# Patient Record
Sex: FEMALE | Race: WHITE | HISPANIC OR LATINO | Employment: FULL TIME | URBAN - METROPOLITAN AREA
[De-identification: names, ages, dates, MRNs, and addresses within clinical notes are randomized per-mention and may not be internally consistent; named-entity substitution may affect disease eponyms.]

---

## 2023-02-21 PROBLEM — E53.8 VITAMIN B12 DEFICIENCY: Status: ACTIVE | Noted: 2023-02-21

## 2023-02-21 PROBLEM — F90.2 ADHD (ATTENTION DEFICIT HYPERACTIVITY DISORDER), COMBINED TYPE: Status: ACTIVE | Noted: 2023-02-21

## 2023-02-21 PROBLEM — Z86.59 HISTORY OF DYSTHYMIC DISORDER: Status: ACTIVE | Noted: 2023-02-21

## 2023-02-21 PROBLEM — J45.909 ASTHMA (HHS-HCC): Status: ACTIVE | Noted: 2023-02-21

## 2023-02-21 PROBLEM — E34.9 HORMONE IMBALANCE: Status: ACTIVE | Noted: 2023-02-21

## 2023-02-21 PROBLEM — F90.9 HYPERKINETIC SYNDROME: Status: ACTIVE | Noted: 2023-02-21

## 2023-02-21 PROBLEM — E78.5 HYPERLIPEMIA: Status: ACTIVE | Noted: 2023-02-21

## 2023-02-21 PROBLEM — N39.46 URGE AND STRESS INCONTINENCE: Status: ACTIVE | Noted: 2023-02-21

## 2023-02-21 PROBLEM — J30.9 ALLERGIC RHINITIS: Status: ACTIVE | Noted: 2023-02-21

## 2023-02-21 PROBLEM — E55.9 VITAMIN D INSUFFICIENCY: Status: ACTIVE | Noted: 2023-02-21

## 2023-02-21 PROBLEM — R92.8 ABNORMAL MAMMOGRAM: Status: ACTIVE | Noted: 2023-02-21

## 2023-02-21 RX ORDER — ATOMOXETINE 40 MG/1
1 CAPSULE ORAL 2 TIMES DAILY
COMMUNITY
Start: 2020-06-09 | End: 2023-03-29

## 2023-02-21 RX ORDER — SYRINGE W-NEEDLE,DISPOSAB,3 ML 25GX5/8"
SYRINGE, EMPTY DISPOSABLE MISCELLANEOUS
COMMUNITY
Start: 2021-10-15 | End: 2023-03-29 | Stop reason: ALTCHOICE

## 2023-02-21 RX ORDER — DEXTROAMPHETAMINE SACCHARATE, AMPHETAMINE ASPARTATE, DEXTROAMPHETAMINE SULFATE AND AMPHETAMINE SULFATE 5; 5; 5; 5 MG/1; MG/1; MG/1; MG/1
1 TABLET ORAL 2 TIMES DAILY
COMMUNITY
Start: 2021-09-17 | End: 2023-03-29

## 2023-02-21 RX ORDER — ALBUTEROL SULFATE 90 UG/1
1-2 AEROSOL, METERED RESPIRATORY (INHALATION) EVERY 6 HOURS PRN
COMMUNITY
Start: 2020-06-09 | End: 2023-03-29 | Stop reason: SDUPTHER

## 2023-02-21 RX ORDER — CYANOCOBALAMIN 1000 UG/ML
1000 INJECTION, SOLUTION INTRAMUSCULAR; SUBCUTANEOUS
COMMUNITY
Start: 2021-06-09 | End: 2023-03-29 | Stop reason: ALTCHOICE

## 2023-03-29 ENCOUNTER — OFFICE VISIT (OUTPATIENT)
Dept: PRIMARY CARE | Facility: CLINIC | Age: 48
End: 2023-03-29
Payer: COMMERCIAL

## 2023-03-29 VITALS
SYSTOLIC BLOOD PRESSURE: 102 MMHG | DIASTOLIC BLOOD PRESSURE: 70 MMHG | HEIGHT: 62 IN | BODY MASS INDEX: 35.51 KG/M2 | WEIGHT: 193 LBS | HEART RATE: 85 BPM

## 2023-03-29 DIAGNOSIS — E66.01 SEVERE OBESITY (BMI 35.0-35.9 WITH COMORBIDITY) (MULTI): ICD-10-CM

## 2023-03-29 DIAGNOSIS — Z12.11 COLON CANCER SCREENING: ICD-10-CM

## 2023-03-29 DIAGNOSIS — E53.8 VITAMIN B12 DEFICIENCY: ICD-10-CM

## 2023-03-29 DIAGNOSIS — J30.89 ALLERGIC RHINITIS DUE TO OTHER ALLERGIC TRIGGER, UNSPECIFIED SEASONALITY: ICD-10-CM

## 2023-03-29 DIAGNOSIS — J45.20 MILD INTERMITTENT ASTHMA, UNSPECIFIED WHETHER COMPLICATED (HHS-HCC): Primary | ICD-10-CM

## 2023-03-29 DIAGNOSIS — E55.9 VITAMIN D INSUFFICIENCY: ICD-10-CM

## 2023-03-29 DIAGNOSIS — Z12.31 VISIT FOR SCREENING MAMMOGRAM: ICD-10-CM

## 2023-03-29 DIAGNOSIS — Z79.899 LONG TERM CURRENT USE OF THERAPEUTIC DRUG: ICD-10-CM

## 2023-03-29 DIAGNOSIS — F90.2 ADHD (ATTENTION DEFICIT HYPERACTIVITY DISORDER), COMBINED TYPE: ICD-10-CM

## 2023-03-29 LAB
AMPHETAMINE (PRESENCE) IN URINE BY SCREEN METHOD: NORMAL
BARBITURATES PRESENCE IN URINE BY SCREEN METHOD: NORMAL
BENZODIAZEPINE (PRESENCE) IN URINE BY SCREEN METHOD: NORMAL
CANNABINOIDS IN URINE BY SCREEN METHOD: NORMAL
COCAINE (PRESENCE) IN URINE BY SCREEN METHOD: NORMAL
DRUG SCREEN COMMENT URINE: NORMAL
FENTANYL URINE: NORMAL
METHADONE (PRESENCE) IN URINE BY SCREEN METHOD: NORMAL
OPIATES (PRESENCE) IN URINE BY SCREEN METHOD: NORMAL
OXYCODONE (PRESENCE) IN URINE BY SCREEN METHOD: NORMAL
PHENCYCLIDINE (PRESENCE) IN URINE BY SCREEN METHOD: NORMAL

## 2023-03-29 PROCEDURE — 80307 DRUG TEST PRSMV CHEM ANLYZR: CPT

## 2023-03-29 PROCEDURE — 99214 OFFICE O/P EST MOD 30 MIN: CPT | Performed by: CLINICAL NURSE SPECIALIST

## 2023-03-29 PROCEDURE — 1036F TOBACCO NON-USER: CPT | Performed by: CLINICAL NURSE SPECIALIST

## 2023-03-29 PROCEDURE — 3008F BODY MASS INDEX DOCD: CPT | Performed by: CLINICAL NURSE SPECIALIST

## 2023-03-29 RX ORDER — DEXTROAMPHETAMINE SACCHARATE, AMPHETAMINE ASPARTATE, DEXTROAMPHETAMINE SULFATE AND AMPHETAMINE SULFATE 5; 5; 5; 5 MG/1; MG/1; MG/1; MG/1
20 TABLET ORAL 2 TIMES DAILY
Qty: 60 TABLET | Refills: 0 | Status: SHIPPED | OUTPATIENT
Start: 2023-03-29 | End: 2023-11-06 | Stop reason: ALTCHOICE

## 2023-03-29 RX ORDER — ATOMOXETINE 40 MG/1
40 CAPSULE ORAL 2 TIMES DAILY
Qty: 60 CAPSULE | Refills: 0 | Status: SHIPPED | OUTPATIENT
Start: 2023-03-29 | End: 2023-04-25

## 2023-03-29 RX ORDER — ALBUTEROL SULFATE 90 UG/1
1-2 AEROSOL, METERED RESPIRATORY (INHALATION) EVERY 6 HOURS PRN
Qty: 18 G | Refills: 11 | Status: SHIPPED | OUTPATIENT
Start: 2023-03-29 | End: 2024-03-21 | Stop reason: ALTCHOICE

## 2023-03-29 ASSESSMENT — ENCOUNTER SYMPTOMS
CHEST TIGHTNESS: 0
EYE PAIN: 0
FATIGUE: 0
APPETITE CHANGE: 0
NECK PAIN: 0
CONFUSION: 0
SHORTNESS OF BREATH: 0
DEPRESSION: 0
BRUISES/BLEEDS EASILY: 0
DIZZINESS: 0
TROUBLE SWALLOWING: 0
DYSURIA: 0
SEIZURES: 0
OCCASIONAL FEELINGS OF UNSTEADINESS: 0
FEVER: 0
CHILLS: 0
ABDOMINAL PAIN: 0
WOUND: 0
VOMITING: 0
LOSS OF SENSATION IN FEET: 0
POLYDIPSIA: 0
HEADACHES: 0
FLANK PAIN: 0
COUGH: 0
PHOTOPHOBIA: 0
MYALGIAS: 0
DIARRHEA: 0
CONSTIPATION: 0
BACK PAIN: 0
HEMATURIA: 0
BLOOD IN STOOL: 0
SLEEP DISTURBANCE: 0
PALPITATIONS: 0
ACTIVITY CHANGE: 0
NAUSEA: 0
UNEXPECTED WEIGHT CHANGE: 0
WHEEZING: 0
JOINT SWELLING: 0
ARTHRALGIAS: 0
SORE THROAT: 0

## 2023-03-29 ASSESSMENT — PATIENT HEALTH QUESTIONNAIRE - PHQ9
2. FEELING DOWN, DEPRESSED OR HOPELESS: NOT AT ALL
SUM OF ALL RESPONSES TO PHQ9 QUESTIONS 1 AND 2: 0
1. LITTLE INTEREST OR PLEASURE IN DOING THINGS: NOT AT ALL

## 2023-03-29 ASSESSMENT — COLUMBIA-SUICIDE SEVERITY RATING SCALE - C-SSRS
2. HAVE YOU ACTUALLY HAD ANY THOUGHTS OF KILLING YOURSELF?: NO
6. HAVE YOU EVER DONE ANYTHING, STARTED TO DO ANYTHING, OR PREPARED TO DO ANYTHING TO END YOUR LIFE?: NO
1. IN THE PAST MONTH, HAVE YOU WISHED YOU WERE DEAD OR WISHED YOU COULD GO TO SLEEP AND NOT WAKE UP?: NO

## 2023-03-29 NOTE — PROGRESS NOTES
Subjective   Patient ID: Wendy Nolasco is a 48 y.o. female who presents for Follow-up (Discuss asthma/allergies, ADD).  HPI  OARRS:  Leatha Holt, APRN-CNS on 3/29/2023  3:02 PM  I have personally reviewed the OARRS report for Wendy Nolasco. I have considered the risks of abuse, dependence, addiction and diversion and I believe that it is clinically appropriate for Wendy Nolasco to be prescribed this medication    Is the patient prescribed a combination of a benzodiazepine and opioid?  No    Last Urine Drug Screen / ordered today: Yes  Recent Results (from the past 88558 hour(s))   OPIATE/OPIOID/BENZO PRESCRIPTION COMPLIANCE    Collection Time: 07/30/21 10:11 AM   Result Value Ref Range    DRUG SCREEN COMMENT URINE SEE BELOW     Creatine, Urine 66.3 mg/dL    Amphetamine Screen, Urine PRESUMPTIVE NEGATIVE NEGATIVE    Barbiturate Screen, Urine PRESUMPTIVE NEGATIVE NEGATIVE    Cannabinoid Screen, Urine PRESUMPTIVE NEGATIVE NEGATIVE    Cocaine Screen, Urine PRESUMPTIVE NEGATIVE NEGATIVE    PCP Screen, Urine PRESUMPTIVE NEGATIVE NEGATIVE    7-Aminoclonazepam <25 Cutoff <25 ng/mL    Alpha-Hydroxyalprazolam <25 Cutoff <25 ng/mL    Alpha-Hydroxymidazolam <25 Cutoff <25 ng/mL    Alprazolam <25 Cutoff <25 ng/mL    Chlordiazepoxide <25 Cutoff <25 ng/mL    Clonazepam <25 Cutoff <25 ng/mL    Diazepam <25 Cutoff <25 ng/mL    Lorazepam <25 Cutoff <25 ng/mL    Midazolam <25 Cutoff <25 ng/mL    Nordiazepam <25 Cutoff <25 ng/mL    Oxazepam <25 Cutoff <25 ng/mL    Temazepam <25 Cutoff <25 ng/mL    Zolpidem <25 Cutoff <25 ng/mL    Zolpidem Metabolite (ZCA) <25 Cutoff <25 ng/mL    6-Acetylmorphine <25 Cutoff <25 ng/mL    Codeine <50 Cutoff <50 ng/mL    Hydrocodone <25 Cutoff <25 ng/mL    Hydromorphone <25 Cutoff <25 ng/mL    Morphine Urine <50 Cutoff <50 ng/mL    Norhydrocodone <25 Cutoff <25 ng/mL    Noroxycodone <25 Cutoff <25 ng/mL    Oxycodone <25 Cutoff <25 ng/mL    Oxymorphone <25 Cutoff <25 ng/mL    Tramadol <50 Cutoff <50  ng/mL    O-Desmethyltramadol <50 Cutoff <50 ng/mL    Fentanyl <2.5 Cutoff<2.5 ng/mL    Norfentanyl <2.5 Cutoff<2.5 ng/mL    METHADONE CONFIRMATION,URINE <25 Cutoff <25 ng/mL    EDDP <25 Cutoff <25 ng/mL   Drug Screen, Urine With Reflex to Confirmation    Collection Time: 06/09/21 12:24 PM   Result Value Ref Range    DRUG SCREEN COMMENT URINE SEE BELOW     Amphetamine Screen, Urine PRESUMPTIVE NEGATIVE NEGATIVE    Barbiturate Screen, Urine PRESUMPTIVE NEGATIVE NEGATIVE    BENZODIAZEPINE (PRESENCE) IN URINE BY SCREEN METHOD PRESUMPTIVE NEGATIVE NEGATIVE    Cannabinoid Screen, Urine PRESUMPTIVE POSITIVE (A) NEGATIVE    Cocaine Screen, Urine PRESUMPTIVE NEGATIVE NEGATIVE    Fentanyl, Ur PRESUMPTIVE NEGATIVE NEGATIVE    Methadone Screen, Urine PRESUMPTIVE NEGATIVE NEGATIVE    Opiate Screen, Urine PRESUMPTIVE NEGATIVE NEGATIVE    Oxycodone Screen, Ur PRESUMPTIVE NEGATIVE NEGATIVE    PCP Screen, Urine PRESUMPTIVE NEGATIVE NEGATIVE   Amphetamine Confirm, Urine    Collection Time: 06/09/21 12:24 PM   Result Value Ref Range    Amphetamines,Urine <50 ng/mL    MDA, Urine <200 ng/mL    MDEA, Urine <200 ng/mL    MDMA, Urine <200 ng/mL    Methamphetamine Quant, Ur <200 ng/mL    Phentermine,Urine <200 ng/mL     N/A    Controlled Substance Agreement:  Date of the Last Agreement: 03/29/2023  Reviewed Controlled Substance Agreement including but not limited to the benefits, risks, and alternatives to treatment with a Controlled Substance medication(s).    Stimulants:   What is the patient's goal of therapy? To be able to Focus  Is this being achieved with current treatment? Previously effective.     Activities of Daily Living:   Is your overall impression that this patient is benefiting (symptom reduction outweighs side effects) from stimulant therapy? Restarting management.     1. Physical Functioning: Worse  2. Family Relationship: Worse  3. Social Relationship: Worse  4. Mood: Worse  5. Sleep Patterns: Worse  6. Overall Function:  Worse    Here today as a follow up appointment.     She was previously diagnosed with ADHD and managed through Psychiatry, difficulty with their follow up appointment. Unable to follow with Neurology due to closest location for management was [place] per patient. Symptoms well controlled on Strattera and Adderall at dosage.     Had been treated for ADHD in the past. Has had to drop out of school without medications. Scattered, trouble adjusting. Difficulty staying on task. Struggling with her work environment. Adjusted timing of medications at last OV, feels that it has been more effective for her.     Did not tolerate Wellbutrin in the past. Tried Strattera alone in the past, also not beneficial.     History of Asthma. Controlled. She uses her Albuterol Inhaler PRN she states with the weather change she needed a few times a week but other wise does not use every week. Has not had to use for the last few months. Started on Asmanex in September but states that she no longer needs a maintenance inhaler. She states that her symptoms are normally well controlled when she avoids her triggers. Changed work environment which has helped. Does have seasonal allergies with constant nasal congestion and drainage that has been clear. Not interested in nasal spray. Denies any shortness of breath. Patient has been having relief with her symptoms with the use of Singulair and Loratadine. Was able to stop both Rx. and symptoms have been controlled without the use of Medication.     Complaints of incontinence. Followed with UroGyn. Recommended PT. Had to cancel PT and has not restated seeing yet.     Review of Systems   Constitutional:  Negative for activity change, appetite change, chills, fatigue, fever and unexpected weight change.   HENT:  Negative for ear pain, hearing loss, nosebleeds, sore throat, tinnitus and trouble swallowing.    Eyes:  Negative for photophobia, pain and visual disturbance.   Respiratory:  Negative for  cough, chest tightness, shortness of breath and wheezing.    Cardiovascular:  Negative for chest pain, palpitations and leg swelling.   Gastrointestinal:  Negative for abdominal pain, blood in stool, constipation, diarrhea, nausea and vomiting.   Endocrine: Negative for cold intolerance, heat intolerance, polydipsia and polyuria.   Genitourinary:  Negative for dysuria, flank pain and hematuria.   Musculoskeletal:  Negative for arthralgias, back pain, joint swelling, myalgias and neck pain.   Skin:  Negative for pallor, rash and wound.   Allergic/Immunologic: Negative for immunocompromised state.   Neurological:  Negative for dizziness, seizures and headaches.   Hematological:  Does not bruise/bleed easily.   Psychiatric/Behavioral:  Negative for confusion and sleep disturbance.        Objective   Physical Exam  Vitals and nursing note reviewed.   Constitutional:       General: She is not in acute distress.     Appearance: Normal appearance.   HENT:      Head: Normocephalic.      Nose: Nose normal.   Eyes:      Conjunctiva/sclera: Conjunctivae normal.   Neck:      Vascular: No carotid bruit.   Cardiovascular:      Rate and Rhythm: Normal rate and regular rhythm.      Pulses: Normal pulses.      Heart sounds: Normal heart sounds.   Pulmonary:      Effort: Pulmonary effort is normal.      Breath sounds: Normal breath sounds.   Abdominal:      General: Bowel sounds are normal.      Palpations: Abdomen is soft.   Musculoskeletal:         General: Normal range of motion.      Cervical back: Normal range of motion.   Skin:     General: Skin is warm and dry.   Neurological:      Mental Status: She is alert and oriented to person, place, and time. Mental status is at baseline.   Psychiatric:         Mood and Affect: Mood normal.         Behavior: Behavior normal.         Assessment/Plan       New order for blood work provided at  today.     #1. Asthma: Well controlled with Albuterol PRN. No acute symptoms noted at present.  No longer needing maintenance inhaler.   #2. Vitamin D Deficiency: Vitamin D. Repeat level with next blood work.  #3. Obesity: Last BMI: 35.30. Previously given information on Bariatric Weight Management program. Continuing to work on weight loss.   #4. Allergic Rhinitis: Symptoms are controlled without the use of medication at present. Continue to monitor.   #5. ADHD, #6. 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 Records received in 2015. She states that she needs a new Provider that is more local but [facility] the closest is [facility] per patient. She had been taking Strattera and Adderall XR and had to stop Rx. due to Insurance and then had other home complications. Not as effective, change Adderall. Continue current dosage. I have personally reviewed the OARRS report. This report is scanned into the electronic medical record. I have considered the risks of abuse, dependence, addiction and diversion. I believe that it is clinically appropriate to prescribe this medication. Strattera and Adderall Rx. Follow up in 3 months. UDS: 03/29/2023 Contract: 03/29/2023  #8. Hyperlipidemia: Patient has continued to work on lifestyle modifications. Continue to monitor. Repeat level with next blood work.   #9. Vitamin B12 Deficiency: Vitamin B12 injections. Continue to monitor. Repeat level with next blood work.   #10. Incontinence: Uro/Gyn referral. Ordered PT and was to follow up after.     Declined Flu Vaccine  Mammogram: February 2021. Ordered for 2023.  Cologuard: Ordered.

## 2023-04-04 ENCOUNTER — TELEPHONE (OUTPATIENT)
Dept: PRIMARY CARE | Facility: CLINIC | Age: 48
End: 2023-04-04
Payer: COMMERCIAL

## 2023-04-04 DIAGNOSIS — J45.20 MILD INTERMITTENT ASTHMA, UNSPECIFIED WHETHER COMPLICATED (HHS-HCC): ICD-10-CM

## 2023-04-04 DIAGNOSIS — E53.8 VITAMIN B12 DEFICIENCY: ICD-10-CM

## 2023-04-04 RX ORDER — CYANOCOBALAMIN 1000 UG/ML
1000 INJECTION, SOLUTION INTRAMUSCULAR; SUBCUTANEOUS
Qty: 1 ML | Refills: 11 | Status: SHIPPED | OUTPATIENT
Start: 2023-04-04

## 2023-04-04 RX ORDER — ALBUTEROL SULFATE 90 UG/1
2 AEROSOL, METERED RESPIRATORY (INHALATION) EVERY 4 HOURS PRN
Qty: 18 G | Refills: 5 | Status: SHIPPED | OUTPATIENT
Start: 2023-04-04

## 2023-04-04 RX ORDER — SYRINGE W-NEEDLE,DISPOSAB,3 ML 25GX5/8"
1 SYRINGE, EMPTY DISPOSABLE MISCELLANEOUS
Qty: 1 EACH | Refills: 11 | Status: SHIPPED | OUTPATIENT
Start: 2023-04-04 | End: 2024-03-21 | Stop reason: ALTCHOICE

## 2023-04-04 NOTE — TELEPHONE ENCOUNTER
She called about a scripts for a Vitamin B called in to [facility] in [place] and she also stated that the inhaler was not approved by her ins. If you have any questions please call her at [phone]

## 2023-04-05 ENCOUNTER — LAB (OUTPATIENT)
Dept: LAB | Facility: LAB | Age: 48
End: 2023-04-05
Payer: COMMERCIAL

## 2023-04-05 DIAGNOSIS — E53.8 VITAMIN B12 DEFICIENCY: ICD-10-CM

## 2023-04-05 DIAGNOSIS — J45.20 MILD INTERMITTENT ASTHMA, UNSPECIFIED WHETHER COMPLICATED (HHS-HCC): ICD-10-CM

## 2023-04-05 DIAGNOSIS — J30.89 ALLERGIC RHINITIS DUE TO OTHER ALLERGIC TRIGGER, UNSPECIFIED SEASONALITY: ICD-10-CM

## 2023-04-05 DIAGNOSIS — E55.9 VITAMIN D INSUFFICIENCY: ICD-10-CM

## 2023-04-05 DIAGNOSIS — F90.2 ADHD (ATTENTION DEFICIT HYPERACTIVITY DISORDER), COMBINED TYPE: ICD-10-CM

## 2023-04-05 LAB
ALANINE AMINOTRANSFERASE (SGPT) (U/L) IN SER/PLAS: 11 U/L (ref 7–45)
ALBUMIN (G/DL) IN SER/PLAS: 4 G/DL (ref 3.4–5)
ALKALINE PHOSPHATASE (U/L) IN SER/PLAS: 77 U/L (ref 33–110)
ANION GAP IN SER/PLAS: 15 MMOL/L (ref 10–20)
ASPARTATE AMINOTRANSFERASE (SGOT) (U/L) IN SER/PLAS: 12 U/L (ref 9–39)
BILIRUBIN TOTAL (MG/DL) IN SER/PLAS: 0.5 MG/DL (ref 0–1.2)
CALCIDIOL (25 OH VITAMIN D3) (NG/ML) IN SER/PLAS: 13 NG/ML
CALCIUM (MG/DL) IN SER/PLAS: 9.2 MG/DL (ref 8.6–10.6)
CARBON DIOXIDE, TOTAL (MMOL/L) IN SER/PLAS: 26 MMOL/L (ref 21–32)
CHLORIDE (MMOL/L) IN SER/PLAS: 105 MMOL/L (ref 98–107)
CHOLESTEROL (MG/DL) IN SER/PLAS: 191 MG/DL (ref 0–199)
CHOLESTEROL IN HDL (MG/DL) IN SER/PLAS: 48.2 MG/DL
CHOLESTEROL/HDL RATIO: 4
COBALAMIN (VITAMIN B12) (PG/ML) IN SER/PLAS: 337 PG/ML (ref 211–911)
CREATININE (MG/DL) IN SER/PLAS: 0.85 MG/DL (ref 0.5–1.05)
ERYTHROCYTE DISTRIBUTION WIDTH (RATIO) BY AUTOMATED COUNT: 15.5 % (ref 11.5–14.5)
ERYTHROCYTE MEAN CORPUSCULAR HEMOGLOBIN CONCENTRATION (G/DL) BY AUTOMATED: 31.2 G/DL (ref 32–36)
ERYTHROCYTE MEAN CORPUSCULAR VOLUME (FL) BY AUTOMATED COUNT: 84 FL (ref 80–100)
ERYTHROCYTES (10*6/UL) IN BLOOD BY AUTOMATED COUNT: 4.89 X10E12/L (ref 4–5.2)
GFR FEMALE: 85 ML/MIN/1.73M2
GLUCOSE (MG/DL) IN SER/PLAS: 97 MG/DL (ref 74–99)
HEMATOCRIT (%) IN BLOOD BY AUTOMATED COUNT: 41 % (ref 36–46)
HEMOGLOBIN (G/DL) IN BLOOD: 12.8 G/DL (ref 12–16)
LDL: 115 MG/DL (ref 0–99)
LEUKOCYTES (10*3/UL) IN BLOOD BY AUTOMATED COUNT: 4.7 X10E9/L (ref 4.4–11.3)
NRBC (PER 100 WBCS) BY AUTOMATED COUNT: 0 /100 WBC (ref 0–0)
PLATELETS (10*3/UL) IN BLOOD AUTOMATED COUNT: 326 X10E9/L (ref 150–450)
POTASSIUM (MMOL/L) IN SER/PLAS: 4.5 MMOL/L (ref 3.5–5.3)
PROTEIN TOTAL: 7 G/DL (ref 6.4–8.2)
SODIUM (MMOL/L) IN SER/PLAS: 141 MMOL/L (ref 136–145)
THYROTROPIN (MIU/L) IN SER/PLAS BY DETECTION LIMIT <= 0.05 MIU/L: 0.83 MIU/L (ref 0.44–3.98)
TRIGLYCERIDE (MG/DL) IN SER/PLAS: 138 MG/DL (ref 0–149)
UREA NITROGEN (MG/DL) IN SER/PLAS: 10 MG/DL (ref 6–23)
VLDL: 28 MG/DL (ref 0–40)

## 2023-04-05 PROCEDURE — 82607 VITAMIN B-12: CPT

## 2023-04-05 PROCEDURE — 82306 VITAMIN D 25 HYDROXY: CPT

## 2023-04-05 PROCEDURE — 84443 ASSAY THYROID STIM HORMONE: CPT

## 2023-04-05 PROCEDURE — 80061 LIPID PANEL: CPT

## 2023-04-05 PROCEDURE — 85027 COMPLETE CBC AUTOMATED: CPT

## 2023-04-05 PROCEDURE — 36415 COLL VENOUS BLD VENIPUNCTURE: CPT

## 2023-04-05 PROCEDURE — 80053 COMPREHEN METABOLIC PANEL: CPT

## 2023-04-06 ENCOUNTER — TELEPHONE (OUTPATIENT)
Dept: PRIMARY CARE | Facility: CLINIC | Age: 48
End: 2023-04-06
Payer: COMMERCIAL

## 2023-04-06 NOTE — TELEPHONE ENCOUNTER
----- Message from CLARENCE Cohen-CNS sent at 4/5/2023  9:22 PM EDT -----  Please call patient with lab results. Vitamin D is lower, should increase Vitamin D. B12 is slightly improved, recommend to continue injections. Cholesterol has shown some improvement. Focus on healthy dietary changes. Can discuss further detail at follow up appointment. Thank you!    7/19/18 c/s arrest F 7#8, P[facility]?

## 2023-04-12 ENCOUNTER — TELEPHONE (OUTPATIENT)
Dept: PRIMARY CARE | Facility: CLINIC | Age: 48
End: 2023-04-12
Payer: COMMERCIAL

## 2023-04-12 LAB — NONINV COLON CA DNA+OCC BLD SCRN STL QL: NEGATIVE

## 2023-04-12 NOTE — TELEPHONE ENCOUNTER
----- Message from CLARENCE Cohen-CNS sent at 4/12/2023  3:01 PM EDT -----  Cologuard negative. Plan to repeat in 3 years. Thank you!

## 2023-04-25 DIAGNOSIS — F90.2 ADHD (ATTENTION DEFICIT HYPERACTIVITY DISORDER), COMBINED TYPE: ICD-10-CM

## 2023-04-25 RX ORDER — ATOMOXETINE 40 MG/1
CAPSULE ORAL
Qty: 60 CAPSULE | Refills: 0 | Status: SHIPPED | OUTPATIENT
Start: 2023-04-25 | End: 2023-12-12 | Stop reason: WASHOUT

## 2023-06-28 ENCOUNTER — OFFICE VISIT (OUTPATIENT)
Dept: PRIMARY CARE | Facility: CLINIC | Age: 48
End: 2023-06-28
Payer: COMMERCIAL

## 2023-06-28 VITALS
HEART RATE: 81 BPM | WEIGHT: 182 LBS | SYSTOLIC BLOOD PRESSURE: 110 MMHG | HEIGHT: 63 IN | DIASTOLIC BLOOD PRESSURE: 62 MMHG | BODY MASS INDEX: 32.25 KG/M2

## 2023-06-28 DIAGNOSIS — J30.89 ALLERGIC RHINITIS DUE TO OTHER ALLERGIC TRIGGER, UNSPECIFIED SEASONALITY: ICD-10-CM

## 2023-06-28 DIAGNOSIS — J45.20 MILD INTERMITTENT ASTHMA, UNSPECIFIED WHETHER COMPLICATED (HHS-HCC): ICD-10-CM

## 2023-06-28 DIAGNOSIS — F90.2 ADHD (ATTENTION DEFICIT HYPERACTIVITY DISORDER), COMBINED TYPE: ICD-10-CM

## 2023-06-28 DIAGNOSIS — F90.9 HYPERKINETIC SYNDROME: ICD-10-CM

## 2023-06-28 DIAGNOSIS — R10.9 ABDOMINAL PAIN, UNSPECIFIED ABDOMINAL LOCATION: ICD-10-CM

## 2023-06-28 DIAGNOSIS — E55.9 VITAMIN D INSUFFICIENCY: ICD-10-CM

## 2023-06-28 DIAGNOSIS — Z86.59 HISTORY OF DYSTHYMIC DISORDER: Primary | ICD-10-CM

## 2023-06-28 DIAGNOSIS — E53.8 VITAMIN B12 DEFICIENCY: ICD-10-CM

## 2023-06-28 PROCEDURE — 3008F BODY MASS INDEX DOCD: CPT | Performed by: CLINICAL NURSE SPECIALIST

## 2023-06-28 PROCEDURE — 99214 OFFICE O/P EST MOD 30 MIN: CPT | Performed by: CLINICAL NURSE SPECIALIST

## 2023-06-28 PROCEDURE — 1036F TOBACCO NON-USER: CPT | Performed by: CLINICAL NURSE SPECIALIST

## 2023-06-28 RX ORDER — DEXTROAMPHETAMINE SACCHARATE, AMPHETAMINE ASPARTATE, DEXTROAMPHETAMINE SULFATE AND AMPHETAMINE SULFATE 5; 5; 5; 5 MG/1; MG/1; MG/1; MG/1
20 TABLET ORAL 2 TIMES DAILY
Qty: 60 TABLET | Refills: 0 | Status: CANCELLED | OUTPATIENT
Start: 2023-06-28 | End: 2023-07-28

## 2023-06-28 ASSESSMENT — ENCOUNTER SYMPTOMS
NECK PAIN: 0
DIARRHEA: 0
MYALGIAS: 0
ACTIVITY CHANGE: 0
WHEEZING: 0
DYSURIA: 0
CONSTIPATION: 0
NAUSEA: 0
PHOTOPHOBIA: 0
VOMITING: 0
SHORTNESS OF BREATH: 0
EYE PAIN: 0
UNEXPECTED WEIGHT CHANGE: 0
SEIZURES: 0
DIZZINESS: 0
OCCASIONAL FEELINGS OF UNSTEADINESS: 0
CHEST TIGHTNESS: 0
HEMATURIA: 0
SORE THROAT: 0
FATIGUE: 0
ARTHRALGIAS: 0
BRUISES/BLEEDS EASILY: 0
PALPITATIONS: 0
WOUND: 0
ABDOMINAL PAIN: 0
DEPRESSION: 0
BACK PAIN: 0
POLYDIPSIA: 0
FEVER: 0
TROUBLE SWALLOWING: 0
COUGH: 0
HEADACHES: 0
SLEEP DISTURBANCE: 0
APPETITE CHANGE: 0
CHILLS: 0
CONFUSION: 0
BLOOD IN STOOL: 0
JOINT SWELLING: 0
FLANK PAIN: 0
LOSS OF SENSATION IN FEET: 0

## 2023-06-28 ASSESSMENT — PATIENT HEALTH QUESTIONNAIRE - PHQ9
SUM OF ALL RESPONSES TO PHQ9 QUESTIONS 1 AND 2: 0
1. LITTLE INTEREST OR PLEASURE IN DOING THINGS: NOT AT ALL
2. FEELING DOWN, DEPRESSED OR HOPELESS: NOT AT ALL

## 2023-06-28 ASSESSMENT — COLUMBIA-SUICIDE SEVERITY RATING SCALE - C-SSRS
1. IN THE PAST MONTH, HAVE YOU WISHED YOU WERE DEAD OR WISHED YOU COULD GO TO SLEEP AND NOT WAKE UP?: NO
2. HAVE YOU ACTUALLY HAD ANY THOUGHTS OF KILLING YOURSELF?: NO
6. HAVE YOU EVER DONE ANYTHING, STARTED TO DO ANYTHING, OR PREPARED TO DO ANYTHING TO END YOUR LIFE?: NO

## 2023-06-28 NOTE — PROGRESS NOTES
Subjective   Patient ID: Wendy Nolasco is a 48 y.o. female who presents for Follow-up (Follow up).  HPI    OARRS:  Leatha Holt, APRN-CNS on 6/28/2023  9:32 PM  I have personally reviewed the OARRS report for Wendy Nolasco. I have considered the risks of abuse, dependence, addiction and diversion and I believe that it is clinically appropriate for Wendy Nolasco to be prescribed this medication    Is the patient prescribed a combination of a benzodiazepine and opioid?  No    Last Urine Drug Screen / ordered today: Yes  Recent Results (from the past 63173 hour(s))   Drug Screen, Urine With Reflex to Confirmation    Collection Time: 03/29/23  3:34 PM   Result Value Ref Range    DRUG SCREEN COMMENT URINE SEE BELOW     Amphetamine Screen, Urine PRESUMPTIVE NEGATIVE NEGATIVE    Barbiturate Screen, Urine PRESUMPTIVE NEGATIVE NEGATIVE    BENZODIAZEPINE (PRESENCE) IN URINE BY SCREEN METHOD PRESUMPTIVE NEGATIVE NEGATIVE    Cannabinoid Screen, Urine PRESUMPTIVE NEGATIVE NEGATIVE    Cocaine Screen, Urine PRESUMPTIVE NEGATIVE NEGATIVE    Fentanyl, Ur PRESUMPTIVE NEGATIVE NEGATIVE    Methadone Screen, Urine PRESUMPTIVE NEGATIVE NEGATIVE    Opiate Screen, Urine PRESUMPTIVE NEGATIVE NEGATIVE    Oxycodone Screen, Ur PRESUMPTIVE NEGATIVE NEGATIVE    PCP Screen, Urine PRESUMPTIVE NEGATIVE NEGATIVE   OPIATE/OPIOID/BENZO PRESCRIPTION COMPLIANCE    Collection Time: 07/30/21 10:11 AM   Result Value Ref Range    DRUG SCREEN COMMENT URINE SEE BELOW     Creatine, Urine 66.3 mg/dL    Amphetamine Screen, Urine PRESUMPTIVE NEGATIVE NEGATIVE    Barbiturate Screen, Urine PRESUMPTIVE NEGATIVE NEGATIVE    Cannabinoid Screen, Urine PRESUMPTIVE NEGATIVE NEGATIVE    Cocaine Screen, Urine PRESUMPTIVE NEGATIVE NEGATIVE    PCP Screen, Urine PRESUMPTIVE NEGATIVE NEGATIVE    7-Aminoclonazepam <25 Cutoff <25 ng/mL    Alpha-Hydroxyalprazolam <25 Cutoff <25 ng/mL    Alpha-Hydroxymidazolam <25 Cutoff <25 ng/mL    Alprazolam <25 Cutoff <25 ng/mL     Chlordiazepoxide <25 Cutoff <25 ng/mL    Clonazepam <25 Cutoff <25 ng/mL    Diazepam <25 Cutoff <25 ng/mL    Lorazepam <25 Cutoff <25 ng/mL    Midazolam <25 Cutoff <25 ng/mL    Nordiazepam <25 Cutoff <25 ng/mL    Oxazepam <25 Cutoff <25 ng/mL    Temazepam <25 Cutoff <25 ng/mL    Zolpidem <25 Cutoff <25 ng/mL    Zolpidem Metabolite (ZCA) <25 Cutoff <25 ng/mL    6-Acetylmorphine <25 Cutoff <25 ng/mL    Codeine <50 Cutoff <50 ng/mL    Hydrocodone <25 Cutoff <25 ng/mL    Hydromorphone <25 Cutoff <25 ng/mL    Morphine Urine <50 Cutoff <50 ng/mL    Norhydrocodone <25 Cutoff <25 ng/mL    Noroxycodone <25 Cutoff <25 ng/mL    Oxycodone <25 Cutoff <25 ng/mL    Oxymorphone <25 Cutoff <25 ng/mL    Tramadol <50 Cutoff <50 ng/mL    O-Desmethyltramadol <50 Cutoff <50 ng/mL    Fentanyl <2.5 Cutoff<2.5 ng/mL    Norfentanyl <2.5 Cutoff<2.5 ng/mL    METHADONE CONFIRMATION,URINE <25 Cutoff <25 ng/mL    EDDP <25 Cutoff <25 ng/mL   Amphetamine Confirm, Urine    Collection Time: 06/09/21 12:24 PM   Result Value Ref Range    Amphetamines,Urine <50 ng/mL    MDA, Urine <200 ng/mL    MDEA, Urine <200 ng/mL    MDMA, Urine <200 ng/mL    Methamphetamine Quant, Ur <200 ng/mL    Phentermine,Urine <200 ng/mL     Results are as expected.     Controlled Substance Agreement:  Date of the Last Agreement: 03/29/2023  Reviewed Controlled Substance Agreement including but not limited to the benefits, risks, and alternatives to treatment with a Controlled Substance medication(s).    Stimulants:   What is the patient's goal of therapy? To be able to Focus  Is this being achieved with current treatment? Previously effective    Activities of Daily Living:   Is your overall impression that this patient is benefiting (symptom reduction outweighs side effects) from stimulant therapy? Yes     1. Physical Functioning: Same  2. Family Relationship: Same  3. Social Relationship: Same  4. Mood: Same  5. Sleep Patterns: Same  6. Overall Function: Same      Here today  as a follow up appointment.      She was previously diagnosed with ADHD and managed through Psychiatry, difficulty with their follow up appointment. Unable to follow with Neurology due to closest location for management was [place] per patient. Symptoms were well controlled on Strattera and Adderall at dosage. Feels that she is no longer tolerating the Adderall as well.      Had been treated for ADHD in the past. Has had to drop out of school without medications. Scattered, trouble adjusting. Difficulty staying on task. Struggling with her work environment. Adjusted timing of medications at last OV.      Did not tolerate Wellbutrin in the past. Tried Strattera alone in the past, also not beneficial.     Feels that she has been having allergic reaction to foods that she is eating. Worsens after eating out. Unsure of what specifics.      History of Asthma. Controlled. She uses her Albuterol Inhaler PRN she states with the weather change she needed a few times a week but other wise does not use every week. Has not had to use for the last few months. Started on Asmanex in September but states that she no longer needs a maintenance inhaler. She states that her symptoms are normally well controlled when she avoids her triggers. Changed work environment which has helped. Does have seasonal allergies with constant nasal congestion and drainage that has been clear. Not interested in nasal spray. Denies any shortness of breath. Patient has been having relief with her symptoms with the use of Singulair and Loratadine. Was able to stop both Rx. and symptoms have been controlled without the use of Medication.      Complaints of incontinence. Followed with UroGyn. Recommended PT. Had to cancel PT and has not restarted seeing yet.     Review of Systems   Constitutional:  Negative for activity change, appetite change, chills, fatigue, fever and unexpected weight change.   HENT:  Negative for ear pain, hearing loss, nosebleeds, sore  throat, tinnitus and trouble swallowing.    Eyes:  Negative for photophobia, pain and visual disturbance.   Respiratory:  Negative for cough, chest tightness, shortness of breath and wheezing.    Cardiovascular:  Negative for chest pain, palpitations and leg swelling.   Gastrointestinal:  Negative for abdominal pain, blood in stool, constipation, diarrhea, nausea and vomiting.   Endocrine: Negative for cold intolerance, heat intolerance, polydipsia and polyuria.   Genitourinary:  Negative for dysuria, flank pain and hematuria.   Musculoskeletal:  Negative for arthralgias, back pain, joint swelling, myalgias and neck pain.   Skin:  Negative for pallor, rash and wound.   Allergic/Immunologic: Negative for immunocompromised state.   Neurological:  Negative for dizziness, seizures and headaches.   Hematological:  Does not bruise/bleed easily.   Psychiatric/Behavioral:  Negative for confusion and sleep disturbance.        Objective   Physical Exam  Constitutional:       General: She is not in acute distress.     Appearance: Normal appearance.   HENT:      Head: Normocephalic.      Nose: Nose normal.   Eyes:      Conjunctiva/sclera: Conjunctivae normal.   Neck:      Vascular: No carotid bruit.   Cardiovascular:      Rate and Rhythm: Normal rate and regular rhythm.      Pulses: Normal pulses.      Heart sounds: Normal heart sounds.   Pulmonary:      Effort: Pulmonary effort is normal.      Breath sounds: Normal breath sounds.   Abdominal:      General: Bowel sounds are normal.      Palpations: Abdomen is soft.   Musculoskeletal:         General: Normal range of motion.      Cervical back: Normal range of motion.   Skin:     General: Skin is warm and dry.   Neurological:      Mental Status: She is alert and oriented to person, place, and time. Mental status is at baseline.   Psychiatric:         Mood and Affect: Mood normal.         Behavior: Behavior normal.         Assessment/Plan        New order for blood work provided  at OV today.      #1. Asthma: Well controlled with Albuterol PRN. No acute symptoms noted at present. No longer needing maintenance inhaler.   #2. Vitamin D Deficiency: Vitamin D. Repeat level with next blood work.  #3. Obesity: BMI: 32.76. Previously given information on Bariatric Weight Management program. Continuing to work on weight loss.   #4. Allergic Rhinitis: Symptoms are controlled without the use of medication at present. Continue to monitor.   #5. ADHD, #6. 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 Records received in 2015. She states that she needs a new Provider that is more local but [facility] the closest is [facility] per patient. She had been taking Strattera and Adderall and had to stop Rx. due to Insurance and then had other home complications. Not as effective. I have personally reviewed the OARRS report. This report is scanned into the electronic medical record. I have considered the risks of abuse, dependence, addiction and diversion. I believe that it is clinically appropriate to prescribe this medication. Strattera and Adderall Rx. Follow up in 3 months. UDS: 03/29/2023 Contract: 03/29/2023. [facility] referral.   #8. Hyperlipidemia: Patient has continued to work on lifestyle modifications. Continue to monitor. Repeat level with next blood work.   #9. Vitamin B12 Deficiency: Vitamin B12 injections. Continue to monitor. Repeat level with next blood work.   #10. Incontinence: Uro/Gyn referral. Ordered PT and was to follow up after.   #11. Abdominal Pain: Updated lab work ordered. Will follow up pending results.      Declined Flu Vaccine  Mammogram: February 2021. Ordered for 2023.  Cologuard: April 2023, negative.

## 2023-06-29 ENCOUNTER — APPOINTMENT (OUTPATIENT)
Dept: PRIMARY CARE | Facility: CLINIC | Age: 48
End: 2023-06-29
Payer: COMMERCIAL

## 2023-09-27 ENCOUNTER — OFFICE VISIT (OUTPATIENT)
Dept: PRIMARY CARE | Facility: CLINIC | Age: 48
End: 2023-09-27
Payer: COMMERCIAL

## 2023-09-27 VITALS
SYSTOLIC BLOOD PRESSURE: 120 MMHG | HEIGHT: 63 IN | DIASTOLIC BLOOD PRESSURE: 72 MMHG | BODY MASS INDEX: 32.43 KG/M2 | HEART RATE: 78 BPM | WEIGHT: 183 LBS

## 2023-09-27 DIAGNOSIS — F90.2 ADHD (ATTENTION DEFICIT HYPERACTIVITY DISORDER), COMBINED TYPE: ICD-10-CM

## 2023-09-27 DIAGNOSIS — R10.9 ABDOMINAL PAIN, UNSPECIFIED ABDOMINAL LOCATION: ICD-10-CM

## 2023-09-27 DIAGNOSIS — J45.20 MILD INTERMITTENT ASTHMA, UNSPECIFIED WHETHER COMPLICATED (HHS-HCC): ICD-10-CM

## 2023-09-27 DIAGNOSIS — J30.89 ALLERGIC RHINITIS DUE TO OTHER ALLERGIC TRIGGER, UNSPECIFIED SEASONALITY: ICD-10-CM

## 2023-09-27 DIAGNOSIS — Z86.59 HISTORY OF DYSTHYMIC DISORDER: ICD-10-CM

## 2023-09-27 DIAGNOSIS — E53.8 VITAMIN B12 DEFICIENCY: ICD-10-CM

## 2023-09-27 DIAGNOSIS — E55.9 VITAMIN D INSUFFICIENCY: ICD-10-CM

## 2023-09-27 DIAGNOSIS — F90.9 HYPERKINETIC SYNDROME: ICD-10-CM

## 2023-09-27 PROCEDURE — 3008F BODY MASS INDEX DOCD: CPT | Performed by: CLINICAL NURSE SPECIALIST

## 2023-09-27 PROCEDURE — 1036F TOBACCO NON-USER: CPT | Performed by: CLINICAL NURSE SPECIALIST

## 2023-09-27 PROCEDURE — 99214 OFFICE O/P EST MOD 30 MIN: CPT | Performed by: CLINICAL NURSE SPECIALIST

## 2023-09-27 ASSESSMENT — ENCOUNTER SYMPTOMS
CHEST TIGHTNESS: 0
WOUND: 0
EYE PAIN: 0
WHEEZING: 0
POLYDIPSIA: 0
NECK PAIN: 0
LOSS OF SENSATION IN FEET: 0
HEMATURIA: 0
SEIZURES: 0
DEPRESSION: 0
BACK PAIN: 0
DYSURIA: 0
SLEEP DISTURBANCE: 0
ARTHRALGIAS: 0
UNEXPECTED WEIGHT CHANGE: 0
ABDOMINAL PAIN: 0
HEADACHES: 0
SORE THROAT: 0
NAUSEA: 0
FATIGUE: 0
DIZZINESS: 0
BLOOD IN STOOL: 0
BRUISES/BLEEDS EASILY: 0
CHILLS: 0
MYALGIAS: 0
DIARRHEA: 0
JOINT SWELLING: 0
APPETITE CHANGE: 0
PHOTOPHOBIA: 0
COUGH: 0
SHORTNESS OF BREATH: 0
FLANK PAIN: 0
CONFUSION: 0
PALPITATIONS: 0
ACTIVITY CHANGE: 0
VOMITING: 0
TROUBLE SWALLOWING: 0
CONSTIPATION: 0
OCCASIONAL FEELINGS OF UNSTEADINESS: 0
FEVER: 0

## 2023-09-27 ASSESSMENT — PATIENT HEALTH QUESTIONNAIRE - PHQ9
1. LITTLE INTEREST OR PLEASURE IN DOING THINGS: NOT AT ALL
2. FEELING DOWN, DEPRESSED OR HOPELESS: NOT AT ALL
SUM OF ALL RESPONSES TO PHQ9 QUESTIONS 1 AND 2: 0

## 2023-09-27 ASSESSMENT — COLUMBIA-SUICIDE SEVERITY RATING SCALE - C-SSRS
2. HAVE YOU ACTUALLY HAD ANY THOUGHTS OF KILLING YOURSELF?: NO
1. IN THE PAST MONTH, HAVE YOU WISHED YOU WERE DEAD OR WISHED YOU COULD GO TO SLEEP AND NOT WAKE UP?: NO
6. HAVE YOU EVER DONE ANYTHING, STARTED TO DO ANYTHING, OR PREPARED TO DO ANYTHING TO END YOUR LIFE?: NO

## 2023-09-27 NOTE — PROGRESS NOTES
Subjective   Patient ID: Wendy Nolasco is a 48 y.o. female who presents for Follow-up (Follow up).  HPI    Here today as a follow up appointment.      She was previously diagnosed with ADHD and managed through Psychiatry, difficulty with their follow up appointment. Unable to follow with Neurology due to closest location for management was [place] per patient. Symptoms were well controlled on Strattera and Adderall at dosage. Feels that she is no longer tolerating the Adderall as well. Followed with Psychiatry, working to adjust the dosing of medication.      Had been treated for ADHD in the past. Has had to drop out of school without medications. Scattered, trouble adjusting. Difficulty staying on task. Struggling with her work environment. Adjusted timing of medications at last OV.      Did not tolerate Wellbutrin in the past. Tried Strattera alone in the past, also not beneficial.      History of Asthma. Controlled. She uses her Albuterol Inhaler PRN she states with the weather change she needed a few times a week but other wise does not use every week. Has not had to use for the last few months. Started on Asmanex in September but states that she no longer needs a maintenance inhaler. She states that her symptoms are normally well controlled when she avoids her triggers. Changed work environment which has helped. Does have seasonal allergies with constant nasal congestion and drainage that has been clear. Not interested in nasal spray. Denies any shortness of breath. Patient has been having relief with her symptoms with the use of Singulair and Loratadine. Was able to stop both Rx. and symptoms have been controlled without the use of Medication.      Complaints of incontinence. Followed with UroGyn. Recommended PT. Had to cancel PT and has not restarted seeing yet.     Review of Systems   Constitutional:  Negative for activity change, appetite change, chills, fatigue, fever and unexpected weight change.   HENT:   Negative for ear pain, hearing loss, nosebleeds, sore throat, tinnitus and trouble swallowing.    Eyes:  Negative for photophobia, pain and visual disturbance.   Respiratory:  Negative for cough, chest tightness, shortness of breath and wheezing.    Cardiovascular:  Negative for chest pain, palpitations and leg swelling.   Gastrointestinal:  Negative for abdominal pain, blood in stool, constipation, diarrhea, nausea and vomiting.   Endocrine: Negative for cold intolerance, heat intolerance, polydipsia and polyuria.   Genitourinary:  Negative for dysuria, flank pain and hematuria.   Musculoskeletal:  Negative for arthralgias, back pain, joint swelling, myalgias and neck pain.   Skin:  Negative for pallor, rash and wound.   Allergic/Immunologic: Negative for immunocompromised state.   Neurological:  Negative for dizziness, seizures and headaches.   Hematological:  Does not bruise/bleed easily.   Psychiatric/Behavioral:  Negative for confusion and sleep disturbance.        Objective   Physical Exam  Constitutional:       General: She is not in acute distress.     Appearance: Normal appearance.   HENT:      Head: Normocephalic.      Nose: Nose normal.   Eyes:      Conjunctiva/sclera: Conjunctivae normal.   Neck:      Vascular: No carotid bruit.   Cardiovascular:      Rate and Rhythm: Normal rate and regular rhythm.      Pulses: Normal pulses.      Heart sounds: Normal heart sounds.   Pulmonary:      Effort: Pulmonary effort is normal.      Breath sounds: Normal breath sounds.   Abdominal:      General: Bowel sounds are normal.      Palpations: Abdomen is soft.   Musculoskeletal:         General: Normal range of motion.      Cervical back: Normal range of motion.   Skin:     General: Skin is warm and dry.   Neurological:      Mental Status: She is alert and oriented to person, place, and time. Mental status is at baseline.   Psychiatric:         Mood and Affect: Mood normal.         Behavior: Behavior normal.        Assessment/Plan         New order for blood work provided at  today.      #1. Asthma: Well controlled with Albuterol PRN. No acute symptoms noted at present. No longer needing maintenance inhaler.   #2. Vitamin D Deficiency: Vitamin D. Repeat level with next blood work.  #3. Obesity: BMI: 32.94. Previously given information on Bariatric Weight Management program. Continuing to work on weight loss.   #4. Allergic Rhinitis: Symptoms are controlled without the use of medication at present. Continue to monitor.   #5. ADHD, #6. 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 Records received in 2015. She states that she needs a new Provider that is more local but [facility] with her [facility] the closest is [facility] per patient. She had been taking Strattera and Adderall and had to stop Rx. due to Insurance and then had other home complications. Not as effective. I have personally reviewed the OARRS report. This report is scanned into the electronic medical record. I have considered the risks of abuse, dependence, addiction and diversion. I believe that it is clinically appropriate to prescribe this medication. Strattera and Adderall Rx. UDS: 03/29/2023 Contract: 03/29/2023. [facility] referral, working with them to manage medication adjustments.   #8. Hyperlipidemia: Patient has continued to work on lifestyle modifications. Continue to monitor. Repeat level with next blood work.   #9. Vitamin B12 Deficiency: Vitamin B12 injections. Continue to monitor. Repeat level with next blood work.   #10. Incontinence: Uro/Gyn referral. Ordered PT and was to follow up after.      Declined Flu Vaccine  Mammogram: February 2021. Ordered for 2023.  Cologuard: April 2023, negative.

## 2023-10-16 ENCOUNTER — TELEPHONE (OUTPATIENT)
Dept: OTHER | Age: 48
End: 2023-10-16
Payer: COMMERCIAL

## 2023-10-25 DIAGNOSIS — Z12.31 VISIT FOR SCREENING MAMMOGRAM: Primary | ICD-10-CM

## 2023-10-30 ENCOUNTER — ANCILLARY PROCEDURE (OUTPATIENT)
Dept: RADIOLOGY | Facility: CLINIC | Age: 48
End: 2023-10-30
Payer: COMMERCIAL

## 2023-10-30 DIAGNOSIS — Z12.31 VISIT FOR SCREENING MAMMOGRAM: ICD-10-CM

## 2023-10-30 PROCEDURE — 77063 BREAST TOMOSYNTHESIS BI: CPT

## 2023-10-30 PROCEDURE — 77067 SCR MAMMO BI INCL CAD: CPT | Performed by: RADIOLOGY

## 2023-10-30 PROCEDURE — 77063 BREAST TOMOSYNTHESIS BI: CPT | Performed by: RADIOLOGY

## 2023-10-31 ENCOUNTER — TELEPHONE (OUTPATIENT)
Dept: PRIMARY CARE | Facility: CLINIC | Age: 48
End: 2023-10-31
Payer: COMMERCIAL

## 2023-10-31 NOTE — TELEPHONE ENCOUNTER
----- Message from CLARENCE Cohen-CNS sent at 10/30/2023  9:00 PM EDT -----  Please let patient know that Mammogram is negative.

## 2023-11-06 ENCOUNTER — TELEMEDICINE (OUTPATIENT)
Dept: BEHAVIORAL HEALTH | Facility: CLINIC | Age: 48
End: 2023-11-06
Payer: COMMERCIAL

## 2023-11-06 DIAGNOSIS — Z86.59 HISTORY OF DYSTHYMIC DISORDER: ICD-10-CM

## 2023-11-06 DIAGNOSIS — F90.2 ADHD (ATTENTION DEFICIT HYPERACTIVITY DISORDER), COMBINED TYPE: ICD-10-CM

## 2023-11-06 PROCEDURE — 99213 OFFICE O/P EST LOW 20 MIN: CPT

## 2023-11-06 RX ORDER — DEXTROAMPHETAMINE SACCHARATE, AMPHETAMINE ASPARTATE, DEXTROAMPHETAMINE SULFATE AND AMPHETAMINE SULFATE 2.5; 2.5; 2.5; 2.5 MG/1; MG/1; MG/1; MG/1
1 TABLET ORAL
COMMUNITY
Start: 2023-09-28 | End: 2023-12-12 | Stop reason: WASHOUT

## 2023-11-06 RX ORDER — GUANFACINE 2 MG/1
2 TABLET, EXTENDED RELEASE ORAL DAILY
Qty: 90 TABLET | Refills: 0 | Status: SHIPPED | OUTPATIENT
Start: 2023-11-06 | End: 2023-12-12 | Stop reason: SDUPTHER

## 2023-11-06 RX ORDER — DEXTROAMPHETAMINE SACCHARATE, AMPHETAMINE ASPARTATE, DEXTROAMPHETAMINE SULFATE AND AMPHETAMINE SULFATE 2.5; 2.5; 2.5; 2.5 MG/1; MG/1; MG/1; MG/1
10 TABLET ORAL DAILY
Qty: 30 TABLET | Refills: 0 | Status: SHIPPED | OUTPATIENT
Start: 2023-11-06 | End: 2023-12-12 | Stop reason: WASHOUT

## 2023-11-06 RX ORDER — DEXTROAMPHETAMINE SACCHARATE, AMPHETAMINE ASPARTATE, DEXTROAMPHETAMINE SULFATE AND AMPHETAMINE SULFATE 2.5; 2.5; 2.5; 2.5 MG/1; MG/1; MG/1; MG/1
10 TABLET ORAL DAILY
Qty: 30 TABLET | Refills: 0 | Status: SHIPPED | OUTPATIENT
Start: 2023-12-05 | End: 2023-12-12 | Stop reason: WASHOUT

## 2023-11-06 RX ORDER — GUANFACINE 1 MG/1
1 TABLET, EXTENDED RELEASE ORAL DAILY
COMMUNITY
Start: 2023-10-30 | End: 2023-11-06 | Stop reason: SDUPTHER
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
## 2023-11-06 NOTE — PATIENT INSTRUCTIONS
Continue Adderall 10mg once daily  Increase Guanfacine to 2mg once daily (please move dosing to bedtime)  Follow up visit on 12/12/23 at 9:30am (in-person)  Please call the office with any questions or concerns

## 2023-12-12 ENCOUNTER — OFFICE VISIT (OUTPATIENT)
Dept: BEHAVIORAL HEALTH | Facility: CLINIC | Age: 48
End: 2023-12-12
Payer: COMMERCIAL

## 2023-12-12 VITALS
RESPIRATION RATE: 16 BRPM | BODY MASS INDEX: 31.83 KG/M2 | SYSTOLIC BLOOD PRESSURE: 110 MMHG | TEMPERATURE: 98.2 F | WEIGHT: 173 LBS | HEART RATE: 71 BPM | HEIGHT: 62 IN | DIASTOLIC BLOOD PRESSURE: 71 MMHG

## 2023-12-12 DIAGNOSIS — Z86.59 HISTORY OF DYSTHYMIC DISORDER: ICD-10-CM

## 2023-12-12 DIAGNOSIS — F90.2 ADHD (ATTENTION DEFICIT HYPERACTIVITY DISORDER), COMBINED TYPE: ICD-10-CM

## 2023-12-12 PROCEDURE — 1036F TOBACCO NON-USER: CPT

## 2023-12-12 PROCEDURE — 99213 OFFICE O/P EST LOW 20 MIN: CPT

## 2023-12-12 PROCEDURE — 3008F BODY MASS INDEX DOCD: CPT

## 2023-12-12 RX ORDER — GUANFACINE 2 MG/1
2 TABLET, EXTENDED RELEASE ORAL DAILY
Qty: 90 TABLET | Refills: 0 | Status: SHIPPED | OUTPATIENT
Start: 2023-12-12 | End: 2024-03-21 | Stop reason: ALTCHOICE

## 2023-12-12 RX ORDER — DEXTROAMPHETAMINE SACCHARATE, AMPHETAMINE ASPARTATE, DEXTROAMPHETAMINE SULFATE AND AMPHETAMINE SULFATE 2.5; 2.5; 2.5; 2.5 MG/1; MG/1; MG/1; MG/1
10 TABLET ORAL 2 TIMES DAILY
Qty: 60 TABLET | Refills: 0 | Status: SHIPPED | OUTPATIENT
Start: 2024-02-10 | End: 2024-03-21 | Stop reason: ALTCHOICE

## 2023-12-12 RX ORDER — DEXTROAMPHETAMINE SACCHARATE, AMPHETAMINE ASPARTATE, DEXTROAMPHETAMINE SULFATE AND AMPHETAMINE SULFATE 2.5; 2.5; 2.5; 2.5 MG/1; MG/1; MG/1; MG/1
10 TABLET ORAL 2 TIMES DAILY
Qty: 60 TABLET | Refills: 0 | Status: SHIPPED | OUTPATIENT
Start: 2023-12-12 | End: 2024-03-21 | Stop reason: ALTCHOICE

## 2023-12-12 RX ORDER — DEXTROAMPHETAMINE SACCHARATE, AMPHETAMINE ASPARTATE, DEXTROAMPHETAMINE SULFATE AND AMPHETAMINE SULFATE 2.5; 2.5; 2.5; 2.5 MG/1; MG/1; MG/1; MG/1
10 TABLET ORAL 2 TIMES DAILY
Qty: 60 TABLET | Refills: 0 | Status: SHIPPED | OUTPATIENT
Start: 2024-01-11 | End: 2024-03-21 | Stop reason: ALTCHOICE

## 2023-12-12 NOTE — PROGRESS NOTES
"Subjective   Patient ID: Wendy Nolasco is a 48 y.o. female who presents for ADHD and Med Management scheduled follow up. Last visit with this writer on 11/6/23.     HPI  Patient is accompanied by her Partner Raheem whom she wishes to provide collateral and be involved in care. Positive response with guanfacine Er 2mg - encouraged to move to bedtime dosing and discussed ways to improve medication adherence. Focus is improved during first half of work day but bothersome ADHD symptoms noted in later half - hard to focus/complete tasks/increasing forgetfulness.  Patient wishes to consider 10mg BID. Poor response with ER in the past. No lightheadedness/dizziness with Guanfacine 2mg. In regards to mood - \"pretty consistent recently\" per SO. Mood up and down with medication adherence.     - Depression -   Mood: stable   No anhedonia   Hobbies/interests: swimming, time with family, reading: enjoying now   Appetite: stable   Weight loss or gain: + weight gain   Sleep: adequate, 6-8 hours/night.   Energy: + fatigue  No hyperactive or slowed days reported   Worthlessness/hopelessness/guilt: denies   Concentration/Focus: distractible per patient. Impaired   Safety: when asked directly, denies any SI/HI plan or intent. Denies any lifetime suicide attempts or self-harm.      - anxiety -   No excessive worrying/anxiety   Restlessness/keyed up or on edge: denies   Irritability: endorses   Muscle tension: denies   Sleep disturbance: denies   No panic attacks reported     Exercise: when able   Support System: daughter, Raheem, son      Safety:   Patient is low acute and low chronic risk of suicide. Static risk factors include female gender,  race and age 48. Dynamic risk factors include above psychiatric symptoms which we are addressing with medication. Protective factors include no previous attempts, no drug abuse, rational thinking intact, good social support, help seeking, no SI, hopeful, future oriented.    The patient " "reports use of   Alcohol: \"socially\" 1 drink/week  Denies tobacco use (former smoker, quit 2016)   Caffeine: (3 energy drinks per week)   Denies marijuana use   no illicit drug use     Objective   No CP  No N/V/D      General Appearance: Well groomed, appropriate eye contact  Attitude/Behavior: Cooperative  Motor: No psychomotor agitation or retardation, no tremor or other abnormal movements  Speech: Normal rate, volume, prosody  Gait/Station: WFL - Within functional limits  Mood: Euthymic/stable  Affect: Euthymic, full-range  Thought Process: Linear, goal directed  Thought Associations: No loosening of associations  Thought Content: Normal  Perception: No perceptual abnormalities noted  Sensorium: Alert and oriented to person, place, time and situation  Insight: Intact  Judgement: Intact  Cognition: Cognitively intact to conversational testing with respect to attention, orientation, fund of knowledge, recent and remote memory, and language    Lab Review:   not applicable    Assessment/Plan   Problem List Items Addressed This Visit             ICD-10-CM    History of dysthymic disorder Z86.59    ADHD (attention deficit hyperactivity disorder), combined type F90.2   Wendy Nolasco is a 48 year old female currently residing in [place], [place] with her BF and daughter. + 4 kids total. Employed full-time at [employer] and may be returning to school shortly to complete program.   Referred to psychiatry with first visit on 7/26/23 - referred by PCP for evaluation r/t ADHD, dysthymic disorder and hyperkinetic syndrome     DSM-5 Diagnosis  h/o ADHD   h/o dysthymic disorder      Current Medications   Adderall 10mg daily  Guanfacine ER 2mg daily   Reviewed OARRS on 12/12/23   Adderall 10mg daily last filled on 11/8/23 for 30 tablets   Drug screen completed March 2023   No side effects or ADRs reported with current regiment  Vital signs stable      - Mood is stable. No symptoms of anxiety reported.   - Focus is improved on current " regiment but room for improvement exists per patient. ADHD symptoms noted in second half of work day  - increase Adderall dose to 10mg BID and c/w guanfacine ER to 2mg  - discussed moving guanfacine dosing to bedtime and ways to improve medication adherence   - Follow up visit in two months, patient will return to PCP after and is aware this provider is leaving  March 2024.  - Wendy is agreeable to plan detailed above     Start time  9:03am   End time 9:50am  Prep/charting time 5min   Total time 22min

## 2023-12-12 NOTE — PATIENT INSTRUCTIONS
Increase Adderall dose to 10mg twice per day.   Continue Guanfacine ER 2mg daily - move dosing to bedtime  Follow up visit in two months or sooner if needed

## 2024-02-21 ENCOUNTER — TELEMEDICINE (OUTPATIENT)
Dept: BEHAVIORAL HEALTH | Facility: CLINIC | Age: 49
End: 2024-02-21
Payer: COMMERCIAL

## 2024-02-21 DIAGNOSIS — Z86.59 HISTORY OF DYSTHYMIC DISORDER: ICD-10-CM

## 2024-02-21 DIAGNOSIS — F90.2 ADHD (ATTENTION DEFICIT HYPERACTIVITY DISORDER), COMBINED TYPE: ICD-10-CM

## 2024-02-21 PROCEDURE — 1036F TOBACCO NON-USER: CPT

## 2024-02-21 PROCEDURE — 3008F BODY MASS INDEX DOCD: CPT

## 2024-02-21 PROCEDURE — 99213 OFFICE O/P EST LOW 20 MIN: CPT

## 2024-02-21 NOTE — PROGRESS NOTES
"Subjective   Patient ID: Wendy Nolasco is a 48 y.o. female who presents for ADHD, Depression, and Med Management scheduled follow up. Last visit with this writer on 12/12/23.     HPI  Patient arrived on-time for virtual appointment. Patient messaged this provider via [employer] since last visit requesting to stop Guanfacine d/t negative impact on Asthma symptoms. She initially messaged requesting low-dose Atomoxetine to be prescribed in addition to Adderall for ADHD which she also found beneficial for asthma symptoms. She describes that her Asthma symptoms have been increasing in frequency/severity since starting and titrating Guanfacine with three exacerbations over the past 1-3 months. She describes feeling panicked when unable to breath and has found her inhaler to be less effective. Guanfacine stopped weeks ago with improvements in asthma since per patient. She does not described what would appear to be an allergic reaction and her BP/HR have remained stable on guanfacine regiment. Patient has been avoiding her prescribed stimulant since she stopped guanfacine as she feels Adderall on its own was not as effective or as tolerable. Patient does reports increasing stress at home as her and her family are set to move out of their current home but have not secured a new place to live. At previous visits discussed atypical reaction with stimulants and atomoxetine.     - Depression -   Mood: \"fine\", \"crabby\"   No anhedonia   Hobbies/interests: swimming, time with family, reading: enjoying now   Appetite: stable   Weight loss or gain: + weight gain   Sleep: adequate, 6-8 hours/night.   Energy: + fatigue  No hyperactive or slowed days reported   Worthlessness/hopelessness/guilt: denies   Concentration/Focus: distractible per patient. Impaired   Safety: when asked directly, denies any SI/HI plan or intent. Denies any lifetime suicide attempts or self-harm.      - anxiety -   Episodic periods of anxiety with stressors, " "\"situational stress\"  No persistent worrying/anxiety   Restlessness/keyed up or on edge: denies   Irritability: endorses   Muscle tension: denies   Sleep disturbance: denies   No panic attacks reported     ADHD  Inattentive Symptoms: Often makes careless mistakes, Often has difficulty paying attention, Is often disorganized, Is often easily distracted, and Is often forgetful   Positive response with Adderall and Guanfacine combination  Has previously found different combinations of Atomoxetine, Adderall IR and ER  and Methylphenidate to be intolerable or ineffective     Exercise: when able   Support System: daughter, Raheem, son      Safety:   Patient is low acute and low chronic risk of suicide. Static risk factors include female gender,  race and age 48. Dynamic risk factors include above psychiatric symptoms which we are addressing with medication. Protective factors include no previous attempts, no drug abuse, rational thinking intact, good social support, help seeking, no SI, hopeful, future oriented.    The patient reports use of   Alcohol: \"socially\" 1 drink/week  Denies tobacco use (former smoker, quit 2016)   Caffeine: (3 energy drinks per week)   Denies marijuana use   no illicit drug use     Objective   No CP  No N/V/D     General Appearance: Well groomed, appropriate eye contact  Attitude/Behavior: Cooperative  Motor: No psychomotor agitation or retardation, no tremor or other abnormal movements  Speech: Normal rate, volume, prosody  Gait/Station: WFL - Within functional limits  Mood: \"fine\", \"crabby\"  Affect: Constricted, Sad/Tearful, Congruent with mood and topic of conversation  Thought Process: Linear, goal directed  Thought Associations: No loosening of associations  Thought Content: Normal  Perception: No perceptual abnormalities noted  Sensorium: Alert and oriented to person, place, time and situation  Insight: Intact  Judgement: Intact  Cognition: Cognitively intact to conversational " "testing with respect to attention, orientation, fund of knowledge, recent and remote memory, and language      Lab Review:   not applicable    Assessment/Plan   Problem List Items Addressed This Visit             ICD-10-CM    History of dysthymic disorder Z86.59    ADHD (attention deficit hyperactivity disorder), combined type F90.2   Wendy Nolasco is a 48 year old female with a hx of HLD and asthma currently residing in [place], [place] with her BF and daughter. + 4 kids total. Employed full-time at [employer] and may be returning to school shortly to complete program. Referred to psychiatry with first visit on 7/26/23 - referred by PCP for evaluation r/t ADHD, dysthymic disorder and hyperkinetic syndrome     DSM-5 Diagnosis  ADHD   h/o dysthymic disorder      Current Medications   Adderall IR 10mg BID  Guanfacine ER 2mg daily   Reviewed OARRS on 2/21/24, no discrepancies or concerns noted   Adderall 10mg IR last filled on 1/7/24 for 60tabs/30days   Drug screen completed March 2023   CSA on file from March 2023   No side effects or ADRs reported with current regiment  Vital signs stable      - Mood is described as \"fine\", \"crabby\". No SI/HI  - Increasing stress at home reported   - ADHD symptoms remain present and impactful   - Asthma symptoms appear to be improving since stopping Guanfacine  - discussed in detail previous medication trials and potential treatment options moving forward   - Offered trial of Methylphenidate ER vs low dose Adderall w/ atomoxetine (which patient initially requested and found helpful) vs pausing treatment for ADHD and speaking with PCP to better understand if asthma exacerbation could be related to factors outside of med interaction    - patient will consider her options and agrees to communicate directly with this provider via [employer] when she has made her decision  - STOP guanfacine   - PAUSE Adderall   - Wendy is agreeable to plan detailed above     Start time 8:33am   End time " 8:55am  Prep/charting time 5min  Total time 27min

## 2024-03-21 ENCOUNTER — OFFICE VISIT (OUTPATIENT)
Dept: PRIMARY CARE | Facility: CLINIC | Age: 49
End: 2024-03-21
Payer: COMMERCIAL

## 2024-03-21 VITALS
DIASTOLIC BLOOD PRESSURE: 68 MMHG | BODY MASS INDEX: 31.36 KG/M2 | HEART RATE: 77 BPM | WEIGHT: 177 LBS | SYSTOLIC BLOOD PRESSURE: 102 MMHG | HEIGHT: 63 IN

## 2024-03-21 DIAGNOSIS — Z79.899 LONG TERM CURRENT USE OF THERAPEUTIC DRUG: ICD-10-CM

## 2024-03-21 DIAGNOSIS — F90.2 ADHD (ATTENTION DEFICIT HYPERACTIVITY DISORDER), COMBINED TYPE: ICD-10-CM

## 2024-03-21 DIAGNOSIS — F90.9 HYPERKINETIC SYNDROME: ICD-10-CM

## 2024-03-21 DIAGNOSIS — J45.20 MILD INTERMITTENT ASTHMA, UNSPECIFIED WHETHER COMPLICATED (HHS-HCC): ICD-10-CM

## 2024-03-21 DIAGNOSIS — J30.89 ALLERGIC RHINITIS DUE TO OTHER ALLERGIC TRIGGER, UNSPECIFIED SEASONALITY: ICD-10-CM

## 2024-03-21 DIAGNOSIS — Z86.59 HISTORY OF DYSTHYMIC DISORDER: ICD-10-CM

## 2024-03-21 DIAGNOSIS — E55.9 VITAMIN D INSUFFICIENCY: ICD-10-CM

## 2024-03-21 DIAGNOSIS — E53.8 VITAMIN B12 DEFICIENCY: ICD-10-CM

## 2024-03-21 PROCEDURE — 3008F BODY MASS INDEX DOCD: CPT | Performed by: CLINICAL NURSE SPECIALIST

## 2024-03-21 PROCEDURE — 1036F TOBACCO NON-USER: CPT | Performed by: CLINICAL NURSE SPECIALIST

## 2024-03-21 PROCEDURE — 99214 OFFICE O/P EST MOD 30 MIN: CPT | Performed by: CLINICAL NURSE SPECIALIST

## 2024-03-21 RX ORDER — ALBUTEROL SULFATE 0.83 MG/ML
2.5 SOLUTION RESPIRATORY (INHALATION) EVERY 6 HOURS PRN
COMMUNITY
Start: 2024-02-29

## 2024-03-21 ASSESSMENT — ENCOUNTER SYMPTOMS
CONFUSION: 0
DYSURIA: 0
HEADACHES: 0
VOMITING: 0
UNEXPECTED WEIGHT CHANGE: 0
COUGH: 0
SHORTNESS OF BREATH: 0
PHOTOPHOBIA: 0
WOUND: 0
BACK PAIN: 0
FEVER: 0
CHILLS: 0
OCCASIONAL FEELINGS OF UNSTEADINESS: 0
CHEST TIGHTNESS: 0
ACTIVITY CHANGE: 0
DIZZINESS: 0
DIARRHEA: 0
NAUSEA: 0
TROUBLE SWALLOWING: 0
APPETITE CHANGE: 0
BRUISES/BLEEDS EASILY: 0
EYE PAIN: 0
DEPRESSION: 0
NECK PAIN: 0
FLANK PAIN: 0
WHEEZING: 0
ARTHRALGIAS: 0
SEIZURES: 0
BLOOD IN STOOL: 0
PALPITATIONS: 0
JOINT SWELLING: 0
POLYDIPSIA: 0
LOSS OF SENSATION IN FEET: 0
HEMATURIA: 0
CONSTIPATION: 0
SLEEP DISTURBANCE: 0
ABDOMINAL PAIN: 0
SORE THROAT: 0
FATIGUE: 0
MYALGIAS: 0

## 2024-03-21 ASSESSMENT — COLUMBIA-SUICIDE SEVERITY RATING SCALE - C-SSRS
6. HAVE YOU EVER DONE ANYTHING, STARTED TO DO ANYTHING, OR PREPARED TO DO ANYTHING TO END YOUR LIFE?: NO
2. HAVE YOU ACTUALLY HAD ANY THOUGHTS OF KILLING YOURSELF?: NO
1. IN THE PAST MONTH, HAVE YOU WISHED YOU WERE DEAD OR WISHED YOU COULD GO TO SLEEP AND NOT WAKE UP?: NO

## 2024-03-21 ASSESSMENT — PATIENT HEALTH QUESTIONNAIRE - PHQ9
2. FEELING DOWN, DEPRESSED OR HOPELESS: NOT AT ALL
1. LITTLE INTEREST OR PLEASURE IN DOING THINGS: NOT AT ALL
SUM OF ALL RESPONSES TO PHQ9 QUESTIONS 1 AND 2: 0

## 2024-03-21 NOTE — PROGRESS NOTES
Subjective   Patient ID: Wendy Nolasco is a 49 y.o. female who presents for Follow-up (Follow up).  HPI    Here today as a follow up appointment.      She was previously diagnosed with ADHD and managed through Psychiatry, difficulty with their follow up appointment. Unable to follow with Neurology due to closest location for management was [place] per patient. Symptoms were well controlled on Strattera and Adderall at dosage. Feels that she is no longer tolerating the Adderall as well. Followed with Psychiatry, working to adjust the dosing of medication. Was started on Intuniv but triggered worsening in her Asthma. Stopped medication. Asthma symptoms improved off of medication but now ADHD is no longer as well controlled. Psych provider left, needs to reestablish care.      Had been treated for ADHD in the past. Has had to drop out of school without medications. Scattered, trouble adjusting. Difficulty staying on task. Struggling with her work environment. Adjusted timing of medications at last OV. Now having to adjust medications.      Did not tolerate Wellbutrin in the past. Tried Strattera alone in the past, also not beneficial.      History of Asthma. Controlled until most recent medication change. She uses her Albuterol Inhaler PRN she states with the weather change she needed a few times a week but other wise does not use every week. Has not had to use for the last few months until the medication was changed and then she became uncontrolled. Started on Asmanex in September but states that she no longer needs a maintenance inhaler. She states that her symptoms are normally well controlled when she avoids her triggers. Changed work environment which has helped. Does have seasonal allergies with constant nasal congestion and drainage that has been clear. Not interested in nasal spray. Denies any shortness of breath. Patient has been having relief with her symptoms with the use of Singulair and Loratadine. Was able  to stop both Rx. and symptoms have been controlled without the use of Medication.      Complaints of incontinence. Followed with UroGyn. Recommended PT. Had to cancel PT and has not restarted seeing yet.     Review of Systems   Constitutional:  Negative for activity change, appetite change, chills, fatigue, fever and unexpected weight change.   HENT:  Negative for ear pain, hearing loss, nosebleeds, sore throat, tinnitus and trouble swallowing.    Eyes:  Negative for photophobia, pain and visual disturbance.   Respiratory:  Negative for cough, chest tightness, shortness of breath and wheezing.    Cardiovascular:  Negative for chest pain, palpitations and leg swelling.   Gastrointestinal:  Negative for abdominal pain, blood in stool, constipation, diarrhea, nausea and vomiting.   Endocrine: Negative for cold intolerance, heat intolerance, polydipsia and polyuria.   Genitourinary:  Negative for dysuria, flank pain and hematuria.   Musculoskeletal:  Negative for arthralgias, back pain, joint swelling, myalgias and neck pain.   Skin:  Negative for pallor, rash and wound.   Allergic/Immunologic: Negative for immunocompromised state.   Neurological:  Negative for dizziness, seizures and headaches.   Hematological:  Does not bruise/bleed easily.   Psychiatric/Behavioral:  Negative for confusion and sleep disturbance.        Objective   Physical Exam  Vitals and nursing note reviewed.   Constitutional:       General: She is not in acute distress.     Appearance: Normal appearance.   HENT:      Head: Normocephalic.      Nose: Nose normal.   Eyes:      Conjunctiva/sclera: Conjunctivae normal.   Neck:      Vascular: No carotid bruit.   Cardiovascular:      Rate and Rhythm: Normal rate and regular rhythm.      Pulses: Normal pulses.      Heart sounds: Normal heart sounds.   Pulmonary:      Effort: Pulmonary effort is normal.      Breath sounds: Normal breath sounds.   Abdominal:      General: Bowel sounds are normal.       Palpations: Abdomen is soft.   Musculoskeletal:         General: Normal range of motion.      Cervical back: Normal range of motion.   Skin:     General: Skin is warm and dry.   Neurological:      Mental Status: She is alert and oriented to person, place, and time. Mental status is at baseline.   Psychiatric:         Mood and Affect: Mood normal.         Behavior: Behavior normal.       Assessment/Plan          New order for blood work provided at OV today.      #1. Asthma: Well controlled with Albuterol PRN. No acute symptoms noted at present. No longer needing maintenance inhaler.   #2. Vitamin D Deficiency: Vitamin D. Repeat level with next blood work.  #3. Obesity: BMI: 31.86. Previously given information on Bariatric Weight Management program. Continuing to work on weight loss.   #4. Allergic Rhinitis: Symptoms are controlled without the use of medication at present. Continue to monitor.   #5. ADHD, #6. 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 Records received in 2015. She states that she needs a new Provider that is more local but [facility] the closest is [facility] per patient. She had been taking Strattera and Adderall and had to stop Rx. due to Insurance and then had other home complications. Not as effective. Strattera and Adderall Rx. [facility] referral, working with them to manage medication adjustments. Planning to establish with a new Psych provider to discuss additional medication options.   #8. Hyperlipidemia: Patient has continued to work on lifestyle modifications. Continue to monitor. Repeat level with next blood work.   #9. Vitamin B12 Deficiency: Vitamin B12 injections. Continue to monitor. Repeat level with next blood work.   #10. Incontinence: Uro/Gyn referral. Ordered PT and was to follow up after.      Declined Flu Vaccine  Mammogram: October 2023, negative.   Cologuard:  April 2023, negative.     Leatha Holt, APRN-CNS 03/21/24 9:30 AM

## 2024-03-29 LAB — SCAN RESULT: NORMAL

## 2024-04-01 ENCOUNTER — LAB (OUTPATIENT)
Dept: LAB | Facility: LAB | Age: 49
End: 2024-04-01
Payer: COMMERCIAL

## 2024-04-01 DIAGNOSIS — Z86.59 HISTORY OF DYSTHYMIC DISORDER: ICD-10-CM

## 2024-04-01 DIAGNOSIS — E53.8 VITAMIN B12 DEFICIENCY: ICD-10-CM

## 2024-04-01 DIAGNOSIS — F90.2 ADHD (ATTENTION DEFICIT HYPERACTIVITY DISORDER), COMBINED TYPE: ICD-10-CM

## 2024-04-01 DIAGNOSIS — E55.9 VITAMIN D INSUFFICIENCY: ICD-10-CM

## 2024-04-01 DIAGNOSIS — R10.9 ABDOMINAL PAIN, UNSPECIFIED ABDOMINAL LOCATION: ICD-10-CM

## 2024-04-01 DIAGNOSIS — J30.89 ALLERGIC RHINITIS DUE TO OTHER ALLERGIC TRIGGER, UNSPECIFIED SEASONALITY: ICD-10-CM

## 2024-04-01 DIAGNOSIS — J45.20 MILD INTERMITTENT ASTHMA, UNSPECIFIED WHETHER COMPLICATED (HHS-HCC): ICD-10-CM

## 2024-04-01 DIAGNOSIS — F90.9 HYPERKINETIC SYNDROME: ICD-10-CM

## 2024-04-01 DIAGNOSIS — R73.01 IMPAIRED FASTING GLUCOSE: ICD-10-CM

## 2024-04-01 LAB
25(OH)D3 SERPL-MCNC: 12 NG/ML (ref 30–100)
ALBUMIN SERPL BCP-MCNC: 3.8 G/DL (ref 3.4–5)
ALP SERPL-CCNC: 78 U/L (ref 33–110)
ALT SERPL W P-5'-P-CCNC: 14 U/L (ref 7–45)
ANION GAP SERPL CALC-SCNC: 14 MMOL/L (ref 10–20)
AST SERPL W P-5'-P-CCNC: 15 U/L (ref 9–39)
BILIRUB SERPL-MCNC: 0.4 MG/DL (ref 0–1.2)
BUN SERPL-MCNC: 10 MG/DL (ref 6–23)
CALCIUM SERPL-MCNC: 9.3 MG/DL (ref 8.6–10.6)
CHLORIDE SERPL-SCNC: 107 MMOL/L (ref 98–107)
CHOLEST SERPL-MCNC: 199 MG/DL (ref 0–199)
CHOLESTEROL/HDL RATIO: 4.4
CO2 SERPL-SCNC: 26 MMOL/L (ref 21–32)
CREAT SERPL-MCNC: 0.84 MG/DL (ref 0.5–1.05)
EGFRCR SERPLBLD CKD-EPI 2021: 86 ML/MIN/1.73M*2
ERYTHROCYTE [DISTWIDTH] IN BLOOD BY AUTOMATED COUNT: 15.7 % (ref 11.5–14.5)
GLUCOSE SERPL-MCNC: 121 MG/DL (ref 74–99)
HCT VFR BLD AUTO: 40.4 % (ref 36–46)
HDLC SERPL-MCNC: 45.7 MG/DL
HGB BLD-MCNC: 12.3 G/DL (ref 12–16)
LDLC SERPL CALC-MCNC: 116 MG/DL
MCH RBC QN AUTO: 25.8 PG (ref 26–34)
MCHC RBC AUTO-ENTMCNC: 30.4 G/DL (ref 32–36)
MCV RBC AUTO: 85 FL (ref 80–100)
NON HDL CHOLESTEROL: 153 MG/DL (ref 0–149)
NRBC BLD-RTO: 0 /100 WBCS (ref 0–0)
PLATELET # BLD AUTO: 342 X10*3/UL (ref 150–450)
POTASSIUM SERPL-SCNC: 4.7 MMOL/L (ref 3.5–5.3)
PROT SERPL-MCNC: 6.6 G/DL (ref 6.4–8.2)
RBC # BLD AUTO: 4.77 X10*6/UL (ref 4–5.2)
SODIUM SERPL-SCNC: 142 MMOL/L (ref 136–145)
TRIGL SERPL-MCNC: 189 MG/DL (ref 0–149)
TSH SERPL-ACNC: 0.83 MIU/L (ref 0.44–3.98)
VIT B12 SERPL-MCNC: 448 PG/ML (ref 211–911)
VLDL: 38 MG/DL (ref 0–40)
WBC # BLD AUTO: 4.8 X10*3/UL (ref 4.4–11.3)

## 2024-04-01 PROCEDURE — 85027 COMPLETE CBC AUTOMATED: CPT

## 2024-04-01 PROCEDURE — 86003 ALLG SPEC IGE CRUDE XTRC EA: CPT

## 2024-04-01 PROCEDURE — 80061 LIPID PANEL: CPT

## 2024-04-01 PROCEDURE — 80053 COMPREHEN METABOLIC PANEL: CPT

## 2024-04-01 PROCEDURE — 82607 VITAMIN B-12: CPT

## 2024-04-01 PROCEDURE — 36415 COLL VENOUS BLD VENIPUNCTURE: CPT

## 2024-04-01 PROCEDURE — 83036 HEMOGLOBIN GLYCOSYLATED A1C: CPT

## 2024-04-01 PROCEDURE — 84443 ASSAY THYROID STIM HORMONE: CPT

## 2024-04-01 PROCEDURE — 82306 VITAMIN D 25 HYDROXY: CPT

## 2024-04-02 DIAGNOSIS — R73.01 IMPAIRED FASTING GLUCOSE: Primary | ICD-10-CM

## 2024-04-02 LAB
CLAM IGE QN: <0.1 KU/L
CODFISH IGE QN: <0.1 KU/L
CORN IGE QN: <0.1
EGG WHITE IGE QN: 0.13 KU/L
EST. AVERAGE GLUCOSE BLD GHB EST-MCNC: 111 MG/DL
GLUTEN IGE QN: <0.1 KU/L
HBA1C MFR BLD: 5.5 %
MILK IGE QN: 1.59 KU/L
PEANUT IGE QN: <0.1 KU/L
SCALLOP IGE QN: <0.1 KU/L
SESAME SEED IGE QN: <0.1 KU/L
SHRIMP IGE QN: <0.1 KU/L
SOYBEAN IGE QN: <0.1 KU/L
WALNUT IGE QN: <0.1 KU/L
WHEAT IGE QN: 0.15 KU/L

## 2024-04-03 ENCOUNTER — TELEPHONE (OUTPATIENT)
Dept: PRIMARY CARE | Facility: CLINIC | Age: 49
End: 2024-04-03

## 2024-04-03 ENCOUNTER — APPOINTMENT (OUTPATIENT)
Dept: PRIMARY CARE | Facility: CLINIC | Age: 49
End: 2024-04-03
Payer: COMMERCIAL

## 2024-04-03 DIAGNOSIS — R73.09 ELEVATED GLUCOSE: ICD-10-CM

## 2024-04-03 DIAGNOSIS — E55.9 VITAMIN D INSUFFICIENCY: ICD-10-CM

## 2024-04-03 NOTE — ADDENDUM NOTE
Addended by: NELIA MCNULTY on: 4/3/2024 08:43 AM     Modules accepted: Orders     Orthopaedic Surgery Consult Note    Pt is a 56y Female presenting with R ankle pain s/p mechanical fall this morning while walking her dog. Pt is a community ambulatory at baseline. Pt has been unable to bear weight on affected extremity since time of injury. Pt denies numbness, tingling, or paresthesias in affected limb. Pt denies headstrike or LOC and denies any other orthopaedic injuries at this time. Pt denies taking blood thinners. Pt last ate at yesterday. Denies fevers, dizziness, CP, SOB, N/V, calf pain. Pt works as an  in [address]. Patient has never seen and orthopaedist before.    PMHx:  No pertinent past medical history    PSHx:  No significant past surgical history    Allergies:  No Known Allergies    Medications:  lactated ringers. 1000 milliLiter(s) IV Continuous <Continuous>                                  Social: Denies tobacco, EtOH, or illicit drug use.    Imaging:  XR R Ankle/Tib Fib: comminuted fracture of distal fibula at the level of the plafond, diastasis with tibiotalar dislocation indicative of full-thickness deltoid rupture    Labs:                        13.9   10.11 )-----------( 301      ( 15 Nov 2020 08:53 )             39.4     Vitals:  T(C): 36.8 (11-15-20 @ 08:46), Max: 37.1 (11-15-20 @ 07:43)  HR: 87 (11-15-20 @ 08:46) (87 - 95)  BP: 136/95 (11-15-20 @ 08:46) (136/95 - 137/82)  RR: 17 (11-15-20 @ 08:46) (17 - 22)  SpO2: 98% (11-15-20 @ 08:46) (97% - 98%)    Physical Exam:  Gen: NAD, AAOx3    RLE:   Skin intact  +edema, erythema, ecchymosis at ankle  +TTP over medial and lateral malleoli   Gross Bony Deformity of Ankle  No bony TTP of Hip/Knee/Foot/Toes  NT with AROM/PROM of Hip/Knee/Toes  Able to SLR  Negative logroll  +EHL/FHL/TA/GS  SILT L2-S1  +DP/PT Pulses  Compartments soft and compressible  No calf TTP B/L    Secondary Assessment:  NC/AT, NTTP of clavicles, NTTP of C-,T-,L-Spine, NTTP of Pelvis  UEs: NTTP of Shoulders, Elbows, Wrists, Hands; NT with AROM/PROM of Shoulders, Elbows, Wrists, Hands; AIN/PIN/Med/Uln/Msc/Rad/Ax intact  LLE: Able to SLR, NT with Log Roll, NT with Heel Strike, NTTP of Hip, Knee, Ankle, Foot; NT with AROM/PROM of Hip, Knee, Ankle, Foot; Q/H/Gsc/TA/EHL/FHL intact    Procedure: Procedure explained in detail to patient at bedside. Pt expressed understanding and all questions were answered. Consent for procedure was verbally obtained. Under sterile technique, 10cc 1% lidocaine were injected into the fracture site as a hematoma block. Adequate anesthesia obtained with hematoma block. Closed reduction of the fracture was performed and patient was placed into a well-padded tri-mejias splint. Pt NV intact following splint placement and post reduction XRs demonstrated adequate reduction.

## 2024-04-03 NOTE — TELEPHONE ENCOUNTER
----- Message from CLARENCE Cohen-CNS sent at 4/3/2024  8:05 AM EDT -----  A1C that was added on came back normal. Will continue to monitor. Already had another Annotation for the repeat lab work needed prior to follow up appointment. Thank you!

## 2024-04-03 NOTE — TELEPHONE ENCOUNTER
----- Message from CLARENCE Cohen-CNS sent at 4/2/2024  9:33 PM EDT -----  Please call patient with lab results. Does show a slight allergen to Eggs/Wheat and higher allergen to Milk. Glucose is elevated and Cholesterol is borderline. Did add an A1C to her lab work, will follow up when resulted. Vitamin D is very low. Start/increase Vitamin D. Remaining blood work is stable. Will need to repeat CMP, A1C and Vitamin D prior to follow up appointment. Please place order for lab work. Thank you!

## 2024-09-23 ENCOUNTER — LAB (OUTPATIENT)
Dept: LAB | Facility: LAB | Age: 49
End: 2024-09-23
Payer: COMMERCIAL

## 2024-09-23 DIAGNOSIS — E55.9 VITAMIN D INSUFFICIENCY: ICD-10-CM

## 2024-09-23 DIAGNOSIS — R73.09 ELEVATED GLUCOSE: ICD-10-CM

## 2024-09-23 LAB
25(OH)D3 SERPL-MCNC: 22 NG/ML (ref 30–100)
ALBUMIN SERPL BCP-MCNC: 4.2 G/DL (ref 3.4–5)
ALP SERPL-CCNC: 88 U/L (ref 33–110)
ALT SERPL W P-5'-P-CCNC: 13 U/L (ref 7–45)
ANION GAP SERPL CALC-SCNC: 12 MMOL/L (ref 10–20)
AST SERPL W P-5'-P-CCNC: 17 U/L (ref 9–39)
BILIRUB SERPL-MCNC: 0.5 MG/DL (ref 0–1.2)
BUN SERPL-MCNC: 8 MG/DL (ref 6–23)
CALCIUM SERPL-MCNC: 9.6 MG/DL (ref 8.6–10.6)
CHLORIDE SERPL-SCNC: 105 MMOL/L (ref 98–107)
CO2 SERPL-SCNC: 29 MMOL/L (ref 21–32)
CREAT SERPL-MCNC: 0.87 MG/DL (ref 0.5–1.05)
EGFRCR SERPLBLD CKD-EPI 2021: 83 ML/MIN/1.73M*2
EST. AVERAGE GLUCOSE BLD GHB EST-MCNC: 111 MG/DL
GLUCOSE SERPL-MCNC: 98 MG/DL (ref 74–99)
HBA1C MFR BLD: 5.5 %
POTASSIUM SERPL-SCNC: 4.7 MMOL/L (ref 3.5–5.3)
PROT SERPL-MCNC: 7.4 G/DL (ref 6.4–8.2)
SODIUM SERPL-SCNC: 141 MMOL/L (ref 136–145)

## 2024-09-23 PROCEDURE — 36415 COLL VENOUS BLD VENIPUNCTURE: CPT

## 2024-09-23 PROCEDURE — 83036 HEMOGLOBIN GLYCOSYLATED A1C: CPT

## 2024-09-23 PROCEDURE — 82306 VITAMIN D 25 HYDROXY: CPT

## 2024-09-23 PROCEDURE — 80053 COMPREHEN METABOLIC PANEL: CPT

## 2024-09-25 ENCOUNTER — APPOINTMENT (OUTPATIENT)
Dept: PRIMARY CARE | Facility: CLINIC | Age: 49
End: 2024-09-25
Payer: COMMERCIAL

## 2024-09-25 VITALS — WEIGHT: 185 LBS | BODY MASS INDEX: 32.78 KG/M2 | HEIGHT: 63 IN

## 2024-09-25 DIAGNOSIS — Z79.899 LONG TERM CURRENT USE OF THERAPEUTIC DRUG: ICD-10-CM

## 2024-09-25 DIAGNOSIS — J45.20 MILD INTERMITTENT ASTHMA, UNSPECIFIED WHETHER COMPLICATED (HHS-HCC): ICD-10-CM

## 2024-09-25 DIAGNOSIS — Z12.31 VISIT FOR SCREENING MAMMOGRAM: Primary | ICD-10-CM

## 2024-09-25 DIAGNOSIS — F90.2 ADHD (ATTENTION DEFICIT HYPERACTIVITY DISORDER), COMBINED TYPE: ICD-10-CM

## 2024-09-25 DIAGNOSIS — E55.9 VITAMIN D INSUFFICIENCY: ICD-10-CM

## 2024-09-25 DIAGNOSIS — E53.8 VITAMIN B12 DEFICIENCY: ICD-10-CM

## 2024-09-25 DIAGNOSIS — Z86.59 HISTORY OF DYSTHYMIC DISORDER: ICD-10-CM

## 2024-09-25 DIAGNOSIS — F90.9 HYPERKINETIC SYNDROME: ICD-10-CM

## 2024-09-25 DIAGNOSIS — J30.89 ALLERGIC RHINITIS DUE TO OTHER ALLERGIC TRIGGER, UNSPECIFIED SEASONALITY: ICD-10-CM

## 2024-09-25 PROCEDURE — 3008F BODY MASS INDEX DOCD: CPT | Performed by: CLINICAL NURSE SPECIALIST

## 2024-09-25 PROCEDURE — 1036F TOBACCO NON-USER: CPT | Performed by: CLINICAL NURSE SPECIALIST

## 2024-09-25 PROCEDURE — 99213 OFFICE O/P EST LOW 20 MIN: CPT | Performed by: CLINICAL NURSE SPECIALIST

## 2024-09-25 RX ORDER — ATOMOXETINE 40 MG/1
40 CAPSULE ORAL DAILY
Qty: 30 CAPSULE | Refills: 2 | Status: SHIPPED | OUTPATIENT
Start: 2024-09-25 | End: 2024-12-24

## 2024-09-25 RX ORDER — DEXTROAMPHETAMINE SACCHARATE, AMPHETAMINE ASPARTATE, DEXTROAMPHETAMINE SULFATE AND AMPHETAMINE SULFATE 2.5; 2.5; 2.5; 2.5 MG/1; MG/1; MG/1; MG/1
10 TABLET ORAL DAILY
COMMUNITY
End: 2024-09-25 | Stop reason: SDUPTHER

## 2024-09-25 RX ORDER — ATOMOXETINE 40 MG/1
40 CAPSULE ORAL DAILY
COMMUNITY
End: 2024-09-25 | Stop reason: SDUPTHER

## 2024-09-25 RX ORDER — DEXTROAMPHETAMINE SACCHARATE, AMPHETAMINE ASPARTATE, DEXTROAMPHETAMINE SULFATE AND AMPHETAMINE SULFATE 2.5; 2.5; 2.5; 2.5 MG/1; MG/1; MG/1; MG/1
10 TABLET ORAL DAILY
Qty: 30 TABLET | Refills: 0 | Status: SHIPPED | OUTPATIENT
Start: 2024-09-25 | End: 2024-10-25

## 2024-09-25 ASSESSMENT — ENCOUNTER SYMPTOMS
CONSTIPATION: 0
COUGH: 0
CHEST TIGHTNESS: 0
SLEEP DISTURBANCE: 0
NAUSEA: 0
LOSS OF SENSATION IN FEET: 0
ACTIVITY CHANGE: 0
BRUISES/BLEEDS EASILY: 0
DEPRESSION: 0
SHORTNESS OF BREATH: 0
SORE THROAT: 0
DYSURIA: 0
ARTHRALGIAS: 0
HEADACHES: 0
FATIGUE: 0
PALPITATIONS: 0
APPETITE CHANGE: 0
MYALGIAS: 0
FLANK PAIN: 0
UNEXPECTED WEIGHT CHANGE: 0
HEMATURIA: 0
VOMITING: 0
ABDOMINAL PAIN: 0
PHOTOPHOBIA: 0
CHILLS: 0
OCCASIONAL FEELINGS OF UNSTEADINESS: 0
DIARRHEA: 0
CONFUSION: 0
NECK PAIN: 0
FEVER: 0
BLOOD IN STOOL: 0
SEIZURES: 0
JOINT SWELLING: 0
POLYDIPSIA: 0
DIZZINESS: 0
WOUND: 0
TROUBLE SWALLOWING: 0
EYE PAIN: 0
WHEEZING: 0
BACK PAIN: 0

## 2024-09-25 NOTE — PROGRESS NOTES
Subjective   Patient ID: Wendy Nolasco is a 49 y.o. female who presents for Follow-up (Follow up/).  [facility]    OARRS:  Leatha Holt, APRN-CNS on 9/25/2024  9:45 AM  I have personally reviewed the OARRS report for Wendy Nolasco. I have considered the risks of abuse, dependence, addiction and diversion and I believe that it is clinically appropriate for Wendy Nolasco to be prescribed this medication    Is the patient prescribed a combination of a benzodiazepine and opioid?  No    Last Urine Drug Screen / ordered today: No  No results found for this or any previous visit (from the past 8760 hour(s)).  Results are as expected.     Controlled Substance Agreement:  Date of the Last Agreement: 03/21/2024  Reviewed Controlled Substance Agreement including but not limited to the benefits, risks, and alternatives to treatment with a Controlled Substance medication(s).    Stimulants:   What is the patient's goal of therapy? Ability to focus  Is this being achieved with current treatment? Improved with restarting    Activities of Daily Living:   Is your overall impression that this patient is benefiting (symptom reduction outweighs side effects) from stimulant therapy? Yes     1. Physical Functioning: Same  2. Family Relationship: Same  3. Social Relationship: Same  4. Mood: Same  5. Sleep Patterns: Same  6. Overall Function: Same      Virtual visit today as a follow up appointment.      She was previously diagnosed with ADHD and managed through Psychiatry, difficulty with their follow up appointment. Unable to follow with Neurology due to closest location. Symptoms were well controlled on Strattera and Adderall at dosage. Feels that she is no longer tolerating the Adderall as well. Followed with Psychiatry, working to adjust the dosing of medication. Was started on Intuniv but triggered worsening in her Asthma. Stopped medication. Asthma symptoms improved off of medication but now ADHD is no longer as well controlled. Psych  provider left, needs to reestablish care.      Had been treated for ADHD in the past. Has had to drop out of school without medications. Scattered, trouble adjusting. Difficulty staying on task. Struggling with her work environment. Adjusted timing of medications at last OV. Now having to adjust medications.      Did not tolerate Wellbutrin in the past. Tried Strattera alone in the past, also not beneficial.      History of Asthma. Controlled until most recent medication change. She uses her Albuterol Inhaler PRN she states with the weather change she needed a few times a week but other wise does not use every week. Has not had to use for the last few months until the medication was changed and then she became uncontrolled. Started on Asmanex in September but states that she no longer needs a maintenance inhaler. She states that her symptoms are normally well controlled when she avoids her triggers. Changed work environment which has helped. Does have seasonal allergies with constant nasal congestion and drainage that has been clear. Not interested in nasal spray. Denies any shortness of breath. Patient has been having relief with her symptoms with the use of Singulair and Loratadine. Was able to stop both Rx. and symptoms have been controlled without the use of Medication.      Complaints of incontinence. Followed with UroGyn. Recommended PT. Had to cancel PT and has not restarted seeing yet.        Review of Systems   Constitutional:  Negative for activity change, appetite change, chills, fatigue, fever and unexpected weight change.   HENT:  Negative for ear pain, hearing loss, nosebleeds, sore throat, tinnitus and trouble swallowing.    Eyes:  Negative for photophobia, pain and visual disturbance.   Respiratory:  Negative for cough, chest tightness, shortness of breath and wheezing.    Cardiovascular:  Negative for chest pain, palpitations and leg swelling.   Gastrointestinal:  Negative for abdominal pain, blood  in stool, constipation, diarrhea, nausea and vomiting.   Endocrine: Negative for cold intolerance, heat intolerance, polydipsia and polyuria.   Genitourinary:  Negative for dysuria, flank pain and hematuria.   Musculoskeletal:  Negative for arthralgias, back pain, joint swelling, myalgias and neck pain.   Skin:  Negative for pallor, rash and wound.   Allergic/Immunologic: Negative for immunocompromised state.   Neurological:  Negative for dizziness, seizures and headaches.   Hematological:  Does not bruise/bleed easily.   Psychiatric/Behavioral:  Negative for confusion and sleep disturbance.        Objective   Physical Exam  Constitutional:       Comments: Sitting upright in no acute distress.         Assessment/Plan          New order for blood work provided at OV today.      #1. Asthma: Normally well controlled with Albuterol PRN. No acute symptoms noted at present. No longer needing maintenance inhaler.   #2. Vitamin D Deficiency: Continue to increase Vitamin D. Repeat level with next blood work.  #3. Obesity: BMI: 33.30. Previously given information on Bariatric Weight Management program. Continuing to work on weight loss.   #4. Allergic Rhinitis: Symptoms are controlled without the use of medication at present. Continue to monitor.   #5. ADHD, #6. 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 Records received in 2015. She states that she needs a new Provider that is more local but [facility] the closest is [facility] patient. She had been taking Strattera and Adderall and had to stop Rx. due to Insurance and then had other home complications. Not as effective. Strattera and Adderall Rx. Followed with [facility] clinic referral. Not able to see Psych at this time. Restart medications as previously prescribed. I have personally reviewed the OARRS report.  This report is scanned into the electronic  medical record.  I have considered the risks of abuse, dependence, addiction and diversion.  I believe that it is clinically appropriate to prescribe this medication.   #8. Hyperlipidemia: Patient has continued to work on lifestyle modifications. Continue to monitor. Repeat level with next blood work. Drug Screen: 03/21/2024. Contract: 03/21/2024.   #9. Vitamin B12 Deficiency: Vitamin B12. Continue to monitor. Repeat level with next blood work.   #10. Incontinence: Uro/Gyn referral. Ordered PT and was to follow up after. Has not follow through with Referral at this time.      Declined Flu Vaccine  Mammogram: October 2023, negative. Ordered for 2024.   Cologuard: April 2023, negative.     The patient was interviewed via a secure video link due to the COVID pandemic. The link allowed real-time communication between the patient and the provider. The patient gave permission to perform the clinic visit through this mechanism. We were on the telephone call for approximately 18 minutes. More than 50% of that time was spent in counseling and coordination of care. No physical examination was performed.     Leatha Holt, CLARENCE-CNS 09/25/24 9:27 AM

## 2024-09-30 ENCOUNTER — TELEPHONE (OUTPATIENT)
Dept: PRIMARY CARE | Facility: CLINIC | Age: 49
End: 2024-09-30
Payer: COMMERCIAL

## 2024-09-30 DIAGNOSIS — J45.20 MILD INTERMITTENT ASTHMA, UNSPECIFIED WHETHER COMPLICATED (HHS-HCC): ICD-10-CM

## 2024-09-30 RX ORDER — ALBUTEROL SULFATE 90 UG/1
2 INHALANT RESPIRATORY (INHALATION) EVERY 4 HOURS PRN
Qty: 18 G | Refills: 5 | Status: SHIPPED | OUTPATIENT
Start: 2024-09-30

## 2024-11-06 ENCOUNTER — TELEPHONE (OUTPATIENT)
Dept: PRIMARY CARE | Facility: CLINIC | Age: 49
End: 2024-11-06
Payer: COMMERCIAL

## 2024-11-06 ENCOUNTER — HOSPITAL ENCOUNTER (OUTPATIENT)
Dept: RADIOLOGY | Facility: HOSPITAL | Age: 49
Discharge: HOME | End: 2024-11-06
Payer: COMMERCIAL

## 2024-11-06 DIAGNOSIS — Z12.31 VISIT FOR SCREENING MAMMOGRAM: ICD-10-CM

## 2024-11-06 DIAGNOSIS — F90.2 ADHD (ATTENTION DEFICIT HYPERACTIVITY DISORDER), COMBINED TYPE: ICD-10-CM

## 2024-11-06 PROCEDURE — 77067 SCR MAMMO BI INCL CAD: CPT

## 2024-11-06 PROCEDURE — 77063 BREAST TOMOSYNTHESIS BI: CPT

## 2024-11-06 RX ORDER — DEXTROAMPHETAMINE SACCHARATE, AMPHETAMINE ASPARTATE, DEXTROAMPHETAMINE SULFATE AND AMPHETAMINE SULFATE 2.5; 2.5; 2.5; 2.5 MG/1; MG/1; MG/1; MG/1
10 TABLET ORAL DAILY
Qty: 30 TABLET | Refills: 0 | Status: SHIPPED | OUTPATIENT
Start: 2024-11-06 | End: 2024-12-06

## 2024-11-06 NOTE — TELEPHONE ENCOUNTER
Medication: Adderall     Dosage: 10 mg     Sig: Take 1 tablet (10 mg) by mouth once daily.     Dispense Quantity:    Refills:     Pharmacy: [facility]     LOV: 3/21/24    NOV: 12/30/24

## 2024-12-27 ENCOUNTER — OFFICE VISIT (OUTPATIENT)
Dept: URGENT CARE | Age: 49
End: 2024-12-27
Payer: COMMERCIAL

## 2024-12-27 VITALS
OXYGEN SATURATION: 96 % | DIASTOLIC BLOOD PRESSURE: 76 MMHG | WEIGHT: 185 LBS | SYSTOLIC BLOOD PRESSURE: 112 MMHG | TEMPERATURE: 98.8 F | BODY MASS INDEX: 33.3 KG/M2 | RESPIRATION RATE: 16 BRPM | HEART RATE: 88 BPM

## 2024-12-27 DIAGNOSIS — H92.03 ACUTE EAR PAIN, BILATERAL: Primary | ICD-10-CM

## 2024-12-27 RX ORDER — FLUTICASONE PROPIONATE 50 MCG
1 SPRAY, SUSPENSION (ML) NASAL 2 TIMES DAILY
Qty: 16 G | Refills: 0 | Status: SHIPPED | OUTPATIENT
Start: 2024-12-27 | End: 2025-01-01

## 2024-12-27 ASSESSMENT — PAIN SCALES - GENERAL: PAINLEVEL_OUTOF10: 4

## 2024-12-27 NOTE — PROGRESS NOTES
Subjective   Patient ID: Wendy Nolasco is a 49 y.o. female. They present today with a chief complaint of Earache (X 3 days).    History of Present Illness  Patient reports ~3 days of symptoms  Notes ear discomfort, now L > R  Endorses sore throat  Denies fever  Feels like she has a cold  Notes acute on chronic cough - associates with asthma  Denies ear discharge  Trying to massage nasal bridge to help symptoms     Past Medical History  Allergies as of 12/27/2024 - Reviewed 12/27/2024   Allergen Reaction Noted    Grass pollen Other 02/21/2023    Mold Other 02/21/2023       (Not in a hospital admission)       Past Medical History:   Diagnosis Date    Personal history of other diseases of the digestive system 06/09/2020    History of gastroesophageal reflux (GERD)    Personal history of other diseases of the respiratory system 06/09/2020    History of asthma    Personal history of other mental and behavioral disorders 04/04/2016    History of attention deficit disorder       Past Surgical History:   Procedure Laterality Date    OTHER SURGICAL HISTORY  06/09/2020    Tubal ligation    OTHER SURGICAL HISTORY  06/09/2020    Cholecystectomy    TUBAL LIGATION  04/04/2016    Tubal Ligation        reports that she has never smoked. She has never used smokeless tobacco. She reports current alcohol use. She reports that she does not use drugs.                               Objective    Vitals:    12/27/24 1653   BP: 112/76   Pulse: 88   Resp: 16   Temp: 37.1 °C (98.8 °F)   TempSrc: Oral   SpO2: 96%   Weight: 83.9 kg (185 lb)     No LMP recorded. Patient is postmenopausal.    Physical Exam  Constitutional:       General: She is not in acute distress.     Appearance: Normal appearance. She is not toxic-appearing or diaphoretic.   HENT:      Right Ear: Ear canal and external ear normal. No laceration or drainage. There is no impacted cerumen. No foreign body. No PE tube. Tympanic membrane is injected and bulging (slight). Tympanic  membrane is not retracted.      Left Ear: Ear canal and external ear normal. No laceration or drainage. There is no impacted cerumen. No foreign body. No PE tube. Tympanic membrane is injected and bulging (slight). Tympanic membrane is not retracted.      Nose: No rhinorrhea.   Eyes:      General: No scleral icterus.        Right eye: No discharge.         Left eye: No discharge.      Extraocular Movements: Extraocular movements intact.   Pulmonary:      Effort: Pulmonary effort is normal.   Musculoskeletal:      Cervical back: Normal range of motion.   Neurological:      Mental Status: She is alert.   Psychiatric:         Mood and Affect: Mood normal.         Behavior: Behavior normal.         Thought Content: Thought content normal.      Comments: Pleasant         Procedures    Point of Care Test & Imaging Results from this visit:      Diagnostic study results (if any) were reviewed by Ran Connor MD.    Assessment/Plan   Allergies, medications, history, and pertinent labs/EKGs/Imaging reviewed by Ran Connor MD.     Medical Decision Making:    Patient does not meet criteria for acute otitis media or acute otitis externa and her pain is likely 2/2 viral etiology vs referred pain vs eustachian tube dysfunction. Encourage supportive care measures including increased hydration, intranasal steroids (ex: Flonase), and oral antihistamines (ex: Zyrtec) daily. May also trial decongestants (ex: Sudafed) if symptoms fail to improve from the former measures. Follow up as needed       Orders and Diagnoses  Diagnoses and all orders for this visit:  Acute ear pain, bilateral  -     fluticasone (Flonase Allergy Relief) 50 mcg/actuation nasal spray; Administer 1 spray into each nostril 2 times a day for 5 days.      Patient disposition: Home      Medical Admin Record      Follow Up Instructions  No follow-ups on file.    Electronically signed by Ran Connor MD  10:49 PM

## 2024-12-28 NOTE — PROGRESS NOTES
Subjective   Patient ID: Wendy Nolasco is a 49 y.o. female who presents for Annual Exam (Wellness ).  [facility]    OARRS:  Leatha Holt, APRN-CNS on 12/30/2024 10:51 AM  I have personally reviewed the OARRS report for Wendy Nolasco. I have considered the risks of abuse, dependence, addiction and diversion and I believe that it is clinically appropriate for Wendy Nolasco to be prescribed this medication    Is the patient prescribed a combination of a benzodiazepine and opioid?  No    Last Urine Drug Screen / ordered today: No   No results found for this or any previous visit (from the past 8760 hours).  Results are as expected.     Controlled Substance Agreement:  Date of the Last Agreement: 03/12/2024  Reviewed Controlled Substance Agreement including but not limited to the benefits, risks, and alternatives to treatment with a Controlled Substance medication(s).    Stimulants:   What is the patient's goal of therapy? Ability to complete tasks.   Is this being achieved with current treatment? Yes.     Activities of Daily Living:   Is your overall impression that this patient is benefiting (symptom reduction outweighs side effects) from stimulant therapy? Yes     1. Physical Functioning: Same  2. Family Relationship: Same  3. Social Relationship: Same  4. Mood: Same  5. Sleep Patterns: Same  6. Overall Function: Same      Here today as a follow up appointment. Due for a Wellness Exam.      She was previously diagnosed with ADHD and managed through Psychiatry, difficulty with their follow up appointment. Unable to follow with Neurology due to closest location. Symptoms were well controlled on Strattera and Adderall at dosage. Feels that she is no longer tolerating the Adderall as well. Followed with Psychiatry, working to adjust the dosing of medication. Was started on Intuniv but triggered worsening in her Asthma. Stopped medication. Asthma symptoms improved off of medication but now ADHD was no longer as well controlled  off of medication. Psych provider left, needs to reestablish care.      Had been treated for ADHD in the past. Has had to drop out of school without medications. Scattered, trouble adjusting. Difficulty staying on task. Struggling with her work environment. Adjusted timing of medications at last OV. Did not tolerate Wellbutrin in the past. Tried Strattera alone in the past, also not beneficial. Improved with restarting medications as prescribed.      History of Asthma. Controlled until most recent medication change. She uses her Albuterol Inhaler PRN she states with the weather change she needed a few times a week but other wise does not use every week. Has not had to use for the last few months until the medication was changed and then she became uncontrolled. Started on Asmanex in September but states that she no longer needs a maintenance inhaler. She states that her symptoms are normally well controlled when she avoids her triggers. Changed work environment which has helped. Does have seasonal allergies with constant nasal congestion and drainage that has been clear. Not interested in nasal spray. Denies any shortness of breath. Patient has been having relief with her symptoms with the use of Singulair and Loratadine. Was able to stop both Rx. and symptoms have been controlled without the use of Medication.      Complaints of incontinence. Followed with UroGyn. Recommended PT. Had to cancel PT and has not restarted seeing yet.     Current respiratory symptoms. Seen in the Urgent Care on 12/27. Recommended Flonase and Sudafed.        Review of Systems   Constitutional:  Negative for activity change, appetite change, chills, fatigue, fever and unexpected weight change.   HENT:  Positive for congestion. Negative for ear pain, hearing loss, nosebleeds, sore throat, tinnitus and trouble swallowing.    Eyes:  Negative for photophobia, pain and visual disturbance.   Respiratory:  Positive for cough. Negative for chest  tightness, shortness of breath and wheezing.    Cardiovascular:  Negative for chest pain, palpitations and leg swelling.   Gastrointestinal:  Negative for abdominal pain, blood in stool, constipation, diarrhea, nausea and vomiting.   Endocrine: Negative for cold intolerance, heat intolerance, polydipsia and polyuria.   Genitourinary:  Negative for dysuria, flank pain and hematuria.   Musculoskeletal:  Negative for arthralgias, back pain, joint swelling, myalgias and neck pain.   Skin:  Negative for pallor, rash and wound.   Allergic/Immunologic: Negative for immunocompromised state.   Neurological:  Negative for dizziness, seizures and headaches.   Hematological:  Does not bruise/bleed easily.   Psychiatric/Behavioral:  Negative for confusion and sleep disturbance.        Objective   Physical Exam  Vitals and nursing note reviewed.   Constitutional:       General: She is not in acute distress.     Appearance: Normal appearance.   HENT:      Head: Normocephalic.      Nose: Nose normal.   Eyes:      Conjunctiva/sclera: Conjunctivae normal.   Neck:      Vascular: No carotid bruit.   Cardiovascular:      Rate and Rhythm: Normal rate and regular rhythm.      Pulses: Normal pulses.      Heart sounds: Normal heart sounds.   Pulmonary:      Effort: Pulmonary effort is normal.      Breath sounds: Normal breath sounds.   Abdominal:      General: Bowel sounds are normal.      Palpations: Abdomen is soft.   Musculoskeletal:         General: Normal range of motion.      Cervical back: Normal range of motion.   Skin:     General: Skin is warm and dry.   Neurological:      Mental Status: She is alert and oriented to person, place, and time. Mental status is at baseline.   Psychiatric:         Mood and Affect: Mood normal.         Behavior: Behavior normal.       Assessment/Plan           New order for blood work provided at  today.      #1. Asthma: Normally well controlled with Albuterol PRN. No acute symptoms noted at present.  No longer needing maintenance inhaler.   #2. Vitamin D Deficiency: Continue to increase Vitamin D. Repeat level with next blood work.  #3. Obesity: BMI: 32.22. Previously given information on Bariatric Weight Management program. Continuing to work on weight loss.   #4. Allergic Rhinitis: Symptoms are controlled without the use of medication at present. Continue to monitor.   #5. ADHD, #6. 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 Records received in 2015. She states that she needs a new Provider that is more local but [facility] the closest is [facility] per patient. She had been taking Strattera and Adderall and had to stop Rx. due to Insurance and then had other home complications. Not as effective. Strattera and Adderall Rx. Followed with [facility] referral. Not able to see Psych at this time. Continue medications as previously prescribed. I have personally reviewed the OARRS report.  This report is scanned into the electronic medical record.  I have considered the risks of abuse, dependence, addiction and diversion.  I believe that it is clinically appropriate to prescribe this medication. Drug Screen: 03/21/2024. Contract: 03/21/2024.   #8. Hyperlipidemia: Patient has continued to work on lifestyle modifications. Continue to monitor. Repeat level with next blood work.   #9. Vitamin B12 Deficiency: Vitamin B12. Continue to monitor. Repeat level with next blood work.   #10. Incontinence: Uro/Gyn referral. Ordered PT and was to follow up after. Has not follow through with Referral at this time.   #11. Rhinitis: Medication as prescribed. Start Flonase. Follow up with medication effectiveness.   #12. Wellness: Routine and age appropriate recommendations discussed with the patient today and patient verbalized understanding of the recommendations.  Questions answered.  Age appropriate immunizations  and preventative screenings discussed with the patient and ordered as appropriate. Labs updated and ordered as indicated. Recommend healthy diet and daily exercise to maintain healthy body weight.      Declined Flu Vaccine  Mammogram: November 2024, negative.   Cologuard: April 2023, negative.   Wellness: December 2024.     Leatha Holt, APRN-CNS 12/28/24 5:46 PM

## 2024-12-30 ENCOUNTER — APPOINTMENT (OUTPATIENT)
Dept: PRIMARY CARE | Facility: CLINIC | Age: 49
End: 2024-12-30
Payer: COMMERCIAL

## 2024-12-30 VITALS
BODY MASS INDEX: 31.71 KG/M2 | WEIGHT: 179 LBS | HEIGHT: 63 IN | HEART RATE: 78 BPM | SYSTOLIC BLOOD PRESSURE: 110 MMHG | DIASTOLIC BLOOD PRESSURE: 60 MMHG

## 2024-12-30 DIAGNOSIS — Z86.59 HISTORY OF DYSTHYMIC DISORDER: ICD-10-CM

## 2024-12-30 DIAGNOSIS — Z00.00 HEALTHCARE MAINTENANCE: Primary | ICD-10-CM

## 2024-12-30 DIAGNOSIS — J30.89 ALLERGIC RHINITIS DUE TO OTHER ALLERGIC TRIGGER, UNSPECIFIED SEASONALITY: ICD-10-CM

## 2024-12-30 DIAGNOSIS — F90.9 HYPERKINETIC SYNDROME: ICD-10-CM

## 2024-12-30 DIAGNOSIS — Z12.31 VISIT FOR SCREENING MAMMOGRAM: ICD-10-CM

## 2024-12-30 DIAGNOSIS — J45.20 MILD INTERMITTENT ASTHMA, UNSPECIFIED WHETHER COMPLICATED (HHS-HCC): ICD-10-CM

## 2024-12-30 DIAGNOSIS — E55.9 VITAMIN D INSUFFICIENCY: ICD-10-CM

## 2024-12-30 DIAGNOSIS — E53.8 VITAMIN B12 DEFICIENCY: ICD-10-CM

## 2024-12-30 DIAGNOSIS — F90.2 ADHD (ATTENTION DEFICIT HYPERACTIVITY DISORDER), COMBINED TYPE: ICD-10-CM

## 2024-12-30 DIAGNOSIS — Z79.899 LONG TERM CURRENT USE OF THERAPEUTIC DRUG: ICD-10-CM

## 2024-12-30 PROCEDURE — 1036F TOBACCO NON-USER: CPT | Performed by: CLINICAL NURSE SPECIALIST

## 2024-12-30 PROCEDURE — 3008F BODY MASS INDEX DOCD: CPT | Performed by: CLINICAL NURSE SPECIALIST

## 2024-12-30 PROCEDURE — 99396 PREV VISIT EST AGE 40-64: CPT | Performed by: CLINICAL NURSE SPECIALIST

## 2024-12-30 RX ORDER — ATOMOXETINE 40 MG/1
40 CAPSULE ORAL DAILY
Qty: 30 CAPSULE | Refills: 2 | Status: SHIPPED | OUTPATIENT
Start: 2024-12-30 | End: 2025-03-30

## 2024-12-30 RX ORDER — METHYLPREDNISOLONE 4 MG/1
TABLET ORAL
Qty: 21 TABLET | Refills: 0 | Status: SHIPPED | OUTPATIENT
Start: 2024-12-30 | End: 2025-01-05

## 2024-12-30 RX ORDER — DEXTROAMPHETAMINE SACCHARATE, AMPHETAMINE ASPARTATE, DEXTROAMPHETAMINE SULFATE AND AMPHETAMINE SULFATE 2.5; 2.5; 2.5; 2.5 MG/1; MG/1; MG/1; MG/1
10 TABLET ORAL DAILY
Qty: 30 TABLET | Refills: 0 | Status: SHIPPED | OUTPATIENT
Start: 2024-12-30 | End: 2025-01-29

## 2024-12-30 RX ORDER — DEXTROAMPHETAMINE SACCHARATE, AMPHETAMINE ASPARTATE, DEXTROAMPHETAMINE SULFATE AND AMPHETAMINE SULFATE 2.5; 2.5; 2.5; 2.5 MG/1; MG/1; MG/1; MG/1
10 TABLET ORAL DAILY
Qty: 30 TABLET | Refills: 0 | Status: SHIPPED | OUTPATIENT
Start: 2025-02-28 | End: 2025-03-30

## 2024-12-30 RX ORDER — DEXTROAMPHETAMINE SACCHARATE, AMPHETAMINE ASPARTATE, DEXTROAMPHETAMINE SULFATE AND AMPHETAMINE SULFATE 2.5; 2.5; 2.5; 2.5 MG/1; MG/1; MG/1; MG/1
10 TABLET ORAL DAILY
Qty: 30 TABLET | Refills: 0 | Status: SHIPPED | OUTPATIENT
Start: 2025-01-29 | End: 2025-02-28

## 2024-12-30 ASSESSMENT — ENCOUNTER SYMPTOMS
CHEST TIGHTNESS: 0
MYALGIAS: 0
OCCASIONAL FEELINGS OF UNSTEADINESS: 0
FATIGUE: 0
ARTHRALGIAS: 0
SHORTNESS OF BREATH: 0
CHILLS: 0
DYSURIA: 0
EYE PAIN: 0
UNEXPECTED WEIGHT CHANGE: 0
HEADACHES: 0
APPETITE CHANGE: 0
SORE THROAT: 0
WOUND: 0
JOINT SWELLING: 0
CONSTIPATION: 0
DIARRHEA: 0
DEPRESSION: 0
PHOTOPHOBIA: 0
LOSS OF SENSATION IN FEET: 0
BACK PAIN: 0
BRUISES/BLEEDS EASILY: 0
CONFUSION: 0
NECK PAIN: 0
DIZZINESS: 0
TROUBLE SWALLOWING: 0
ACTIVITY CHANGE: 0
FEVER: 0
FLANK PAIN: 0
SLEEP DISTURBANCE: 0
SEIZURES: 0
BLOOD IN STOOL: 0
VOMITING: 0
WHEEZING: 0
ABDOMINAL PAIN: 0
NAUSEA: 0
COUGH: 1
POLYDIPSIA: 0
PALPITATIONS: 0
HEMATURIA: 0

## 2025-03-20 DIAGNOSIS — J45.20 MILD INTERMITTENT ASTHMA, UNSPECIFIED WHETHER COMPLICATED (HHS-HCC): ICD-10-CM

## 2025-03-20 RX ORDER — ALBUTEROL SULFATE 90 UG/1
2 INHALANT RESPIRATORY (INHALATION) EVERY 4 HOURS PRN
Qty: 8.5 G | Refills: 5 | Status: SHIPPED | OUTPATIENT
Start: 2025-03-20

## 2025-04-08 ENCOUNTER — APPOINTMENT (OUTPATIENT)
Dept: PRIMARY CARE | Facility: CLINIC | Age: 50
End: 2025-04-08
Payer: COMMERCIAL

## 2025-04-15 LAB
25(OH)D3+25(OH)D2 SERPL-MCNC: 23 NG/ML (ref 30–100)
ALBUMIN SERPL-MCNC: 4.2 G/DL (ref 3.6–5.1)
ALP SERPL-CCNC: 74 U/L (ref 31–125)
ALT SERPL-CCNC: 12 U/L (ref 6–29)
ANION GAP SERPL CALCULATED.4IONS-SCNC: 8 MMOL/L (CALC) (ref 7–17)
AST SERPL-CCNC: 15 U/L (ref 10–35)
BILIRUB SERPL-MCNC: 0.5 MG/DL (ref 0.2–1.2)
BUN SERPL-MCNC: 14 MG/DL (ref 7–25)
CALCIUM SERPL-MCNC: 9.4 MG/DL (ref 8.6–10.2)
CHLORIDE SERPL-SCNC: 108 MMOL/L (ref 98–110)
CHOLEST SERPL-MCNC: 188 MG/DL
CHOLEST/HDLC SERPL: 4.1 (CALC)
CO2 SERPL-SCNC: 25 MMOL/L (ref 20–32)
CREAT SERPL-MCNC: 0.81 MG/DL (ref 0.5–0.99)
EGFRCR SERPLBLD CKD-EPI 2021: 89 ML/MIN/1.73M2
ERYTHROCYTE [DISTWIDTH] IN BLOOD BY AUTOMATED COUNT: 14.6 % (ref 11–15)
EST. AVERAGE GLUCOSE BLD GHB EST-MCNC: 111 MG/DL
EST. AVERAGE GLUCOSE BLD GHB EST-SCNC: 6.2 MMOL/L
GLUCOSE SERPL-MCNC: 99 MG/DL (ref 65–99)
HBA1C MFR BLD: 5.5 %
HCT VFR BLD AUTO: 40.6 % (ref 35–45)
HCV AB SERPL QL IA: NORMAL
HDLC SERPL-MCNC: 46 MG/DL
HGB BLD-MCNC: 12.9 G/DL (ref 11.7–15.5)
HIV 1+2 AB+HIV1 P24 AG SERPL QL IA: NORMAL
LDLC SERPL CALC-MCNC: 119 MG/DL (CALC)
MCH RBC QN AUTO: 26.5 PG (ref 27–33)
MCHC RBC AUTO-ENTMCNC: 31.8 G/DL (ref 32–36)
MCV RBC AUTO: 83.5 FL (ref 80–100)
NONHDLC SERPL-MCNC: 142 MG/DL (CALC)
PLATELET # BLD AUTO: 345 THOUSAND/UL (ref 140–400)
PMV BLD REES-ECKER: 10.4 FL (ref 7.5–12.5)
POTASSIUM SERPL-SCNC: 4.1 MMOL/L (ref 3.5–5.3)
PROT SERPL-MCNC: 7 G/DL (ref 6.1–8.1)
RBC # BLD AUTO: 4.86 MILLION/UL (ref 3.8–5.1)
SODIUM SERPL-SCNC: 141 MMOL/L (ref 135–146)
TRIGL SERPL-MCNC: 119 MG/DL
TSH SERPL-ACNC: 1.21 MIU/L
VIT B12 SERPL-MCNC: 365 PG/ML (ref 200–1100)
WBC # BLD AUTO: 3.6 THOUSAND/UL (ref 3.8–10.8)

## 2025-04-17 ENCOUNTER — APPOINTMENT (OUTPATIENT)
Dept: PRIMARY CARE | Facility: CLINIC | Age: 50
End: 2025-04-17
Payer: COMMERCIAL

## 2025-04-17 VITALS
HEART RATE: 78 BPM | WEIGHT: 177 LBS | BODY MASS INDEX: 31.36 KG/M2 | DIASTOLIC BLOOD PRESSURE: 70 MMHG | SYSTOLIC BLOOD PRESSURE: 110 MMHG | HEIGHT: 63 IN

## 2025-04-17 DIAGNOSIS — Z79.899 LONG TERM CURRENT USE OF THERAPEUTIC DRUG: ICD-10-CM

## 2025-04-17 DIAGNOSIS — Z86.59 HISTORY OF DYSTHYMIC DISORDER: ICD-10-CM

## 2025-04-17 DIAGNOSIS — J30.89 ALLERGIC RHINITIS DUE TO OTHER ALLERGIC TRIGGER, UNSPECIFIED SEASONALITY: ICD-10-CM

## 2025-04-17 DIAGNOSIS — F90.9 HYPERKINETIC SYNDROME: ICD-10-CM

## 2025-04-17 DIAGNOSIS — Z00.00 HEALTHCARE MAINTENANCE: Primary | ICD-10-CM

## 2025-04-17 DIAGNOSIS — F90.2 ADHD (ATTENTION DEFICIT HYPERACTIVITY DISORDER), COMBINED TYPE: ICD-10-CM

## 2025-04-17 DIAGNOSIS — E55.9 VITAMIN D INSUFFICIENCY: ICD-10-CM

## 2025-04-17 DIAGNOSIS — J45.20 MILD INTERMITTENT ASTHMA, UNSPECIFIED WHETHER COMPLICATED (HHS-HCC): ICD-10-CM

## 2025-04-17 DIAGNOSIS — Z12.31 VISIT FOR SCREENING MAMMOGRAM: ICD-10-CM

## 2025-04-17 DIAGNOSIS — E53.8 VITAMIN B12 DEFICIENCY: ICD-10-CM

## 2025-04-17 PROCEDURE — 1036F TOBACCO NON-USER: CPT | Performed by: CLINICAL NURSE SPECIALIST

## 2025-04-17 PROCEDURE — 99214 OFFICE O/P EST MOD 30 MIN: CPT | Performed by: CLINICAL NURSE SPECIALIST

## 2025-04-17 PROCEDURE — 3008F BODY MASS INDEX DOCD: CPT | Performed by: CLINICAL NURSE SPECIALIST

## 2025-04-17 RX ORDER — DEXTROAMPHETAMINE SACCHARATE, AMPHETAMINE ASPARTATE, DEXTROAMPHETAMINE SULFATE AND AMPHETAMINE SULFATE 2.5; 2.5; 2.5; 2.5 MG/1; MG/1; MG/1; MG/1
10 TABLET ORAL DAILY
Qty: 30 TABLET | Refills: 0 | Status: SHIPPED | OUTPATIENT
Start: 2025-05-17 | End: 2025-06-16

## 2025-04-17 RX ORDER — DEXTROAMPHETAMINE SACCHARATE, AMPHETAMINE ASPARTATE, DEXTROAMPHETAMINE SULFATE AND AMPHETAMINE SULFATE 2.5; 2.5; 2.5; 2.5 MG/1; MG/1; MG/1; MG/1
10 TABLET ORAL DAILY
Qty: 30 TABLET | Refills: 0 | Status: SHIPPED | OUTPATIENT
Start: 2025-04-17 | End: 2025-05-17

## 2025-04-17 RX ORDER — DEXTROAMPHETAMINE SACCHARATE, AMPHETAMINE ASPARTATE, DEXTROAMPHETAMINE SULFATE AND AMPHETAMINE SULFATE 2.5; 2.5; 2.5; 2.5 MG/1; MG/1; MG/1; MG/1
10 TABLET ORAL DAILY
Qty: 30 TABLET | Refills: 0 | Status: CANCELLED | OUTPATIENT
Start: 2025-07-17 | End: 2025-08-16

## 2025-04-17 RX ORDER — DEXTROAMPHETAMINE SACCHARATE, AMPHETAMINE ASPARTATE, DEXTROAMPHETAMINE SULFATE AND AMPHETAMINE SULFATE 2.5; 2.5; 2.5; 2.5 MG/1; MG/1; MG/1; MG/1
10 TABLET ORAL DAILY
Qty: 30 TABLET | Refills: 0 | Status: SHIPPED | OUTPATIENT
Start: 2025-06-17 | End: 2025-07-17

## 2025-04-17 RX ORDER — ATOMOXETINE 40 MG/1
40 CAPSULE ORAL DAILY
Qty: 30 CAPSULE | Refills: 2 | Status: SHIPPED | OUTPATIENT
Start: 2025-04-17 | End: 2025-07-16

## 2025-04-17 ASSESSMENT — ENCOUNTER SYMPTOMS
TROUBLE SWALLOWING: 0
NECK PAIN: 0
WHEEZING: 0
EYE PAIN: 0
SEIZURES: 0
WOUND: 0
POLYDIPSIA: 0
VOMITING: 0
UNEXPECTED WEIGHT CHANGE: 0
HEADACHES: 0
ACTIVITY CHANGE: 0
JOINT SWELLING: 0
FLANK PAIN: 0
BACK PAIN: 0
OCCASIONAL FEELINGS OF UNSTEADINESS: 0
ARTHRALGIAS: 0
MYALGIAS: 0
FATIGUE: 0
HEMATURIA: 0
CONFUSION: 0
BLOOD IN STOOL: 0
SHORTNESS OF BREATH: 0
SORE THROAT: 0
CONSTIPATION: 0
SLEEP DISTURBANCE: 0
ABDOMINAL PAIN: 0
DIZZINESS: 0
APPETITE CHANGE: 0
NAUSEA: 0
PALPITATIONS: 0
FEVER: 0
LOSS OF SENSATION IN FEET: 0
CHEST TIGHTNESS: 0
CHILLS: 0
PHOTOPHOBIA: 0
DYSURIA: 0
BRUISES/BLEEDS EASILY: 0
DEPRESSION: 0
DIARRHEA: 0
COUGH: 0

## 2025-04-17 NOTE — PROGRESS NOTES
Subjective   Patient ID: Wendy Nolasco is a 50 y.o. female who presents for Follow-up (Follow up).  [facility]    OARRS:  Leatha Holt, APRN-CNS on 4/17/2025 11:23 AM  I have personally reviewed the OARRS report for Wendy Nolasco. I have considered the risks of abuse, dependence, addiction and diversion and I believe that it is clinically appropriate for Wendy Nolasco to be prescribed this medication    Is the patient prescribed a combination of a benzodiazepine and opioid?  No    Last Urine Drug Screen / ordered today: Yes  No results found for this or any previous visit (from the past 8760 hours).  N/A        Controlled Substance Agreement:  Date of the Last Agreement: 04/17/2025  Reviewed Controlled Substance Agreement including but not limited to the benefits, risks, and alternatives to treatment with a Controlled Substance medication(s).    Stimulants:   What is the patient's goal of therapy? Ability for Focus.   Is this being achieved with current treatment? Yes    Activities of Daily Living:   Is your overall impression that this patient is benefiting (symptom reduction outweighs side effects) from stimulant therapy? Yes     1. Physical Functioning: Same  2. Family Relationship: Same  3. Social Relationship: Same  4. Mood: Same  5. Sleep Patterns: Same  6. Overall Function: Same      Here today as a follow up appointment.      She was previously diagnosed with ADHD and managed through Psychiatry, difficulty with their follow up appointment. Unable to follow with Neurology due to closest location. Symptoms were well controlled on Strattera and Adderall at dosage. Feels that she is no longer tolerating the Adderall as well. Followed with Psychiatry, working to adjust the dosing of medication. Was started on Intuniv but triggered worsening in her Asthma. Stopped medication. Asthma symptoms improved off of medication but now ADHD was no longer as well controlled off of medication. Psych provider left, needs to  reestablish care.      Had been treated for ADHD in the past. Has had to drop out of school without medications. Scattered, trouble adjusting. Difficulty staying on task. Struggling with her work environment. Adjusted timing of medications at last OV. Did not tolerate Wellbutrin in the past. Tried Strattera alone in the past, also not beneficial. Improved with restarting medications as prescribed.      History of Asthma. Controlled until most recent medication change. She uses her Albuterol Inhaler PRN she states with the weather change she needed a few times a week but other wise does not use every week. Has not had to use for the last few months until the medication was changed and then she became uncontrolled. Started on Asmanex in September but states that she no longer needs a maintenance inhaler. She states that her symptoms are normally well controlled when she avoids her triggers. Changed work environment which has helped. Does have seasonal allergies with constant nasal congestion and drainage that has been clear. Not interested in nasal spray. Denies any shortness of breath. Patient has been having relief with her symptoms with the use of Singulair and Loratadine. Was able to stop both Rx. and symptoms have been controlled without the use of Medication.      Complaints of incontinence. Followed with UroGyn. Recommended PT. Had to cancel PT and has not restarted seeing yet.      Review of Systems   Constitutional:  Negative for activity change, appetite change, chills, fatigue, fever and unexpected weight change.   HENT:  Negative for ear pain, hearing loss, nosebleeds, sore throat, tinnitus and trouble swallowing.    Eyes:  Negative for photophobia, pain and visual disturbance.   Respiratory:  Negative for cough, chest tightness, shortness of breath and wheezing.    Cardiovascular:  Negative for chest pain, palpitations and leg swelling.   Gastrointestinal:  Negative for abdominal pain, blood in stool,  constipation, diarrhea, nausea and vomiting.   Endocrine: Negative for cold intolerance, heat intolerance, polydipsia and polyuria.   Genitourinary:  Negative for dysuria, flank pain and hematuria.   Musculoskeletal:  Negative for arthralgias, back pain, joint swelling, myalgias and neck pain.   Skin:  Negative for pallor, rash and wound.   Allergic/Immunologic: Negative for immunocompromised state.   Neurological:  Negative for dizziness, seizures and headaches.   Hematological:  Does not bruise/bleed easily.   Psychiatric/Behavioral:  Negative for confusion and sleep disturbance.        Objective   Physical Exam  Vitals and nursing note reviewed.   Constitutional:       General: She is not in acute distress.     Appearance: Normal appearance.   HENT:      Head: Normocephalic.      Nose: Nose normal.   Eyes:      Conjunctiva/sclera: Conjunctivae normal.   Neck:      Vascular: No carotid bruit.   Cardiovascular:      Rate and Rhythm: Normal rate and regular rhythm.      Pulses: Normal pulses.      Heart sounds: Normal heart sounds.   Pulmonary:      Effort: Pulmonary effort is normal.      Breath sounds: Normal breath sounds.   Abdominal:      General: Bowel sounds are normal.      Palpations: Abdomen is soft.   Musculoskeletal:         General: Normal range of motion.      Cervical back: Normal range of motion.   Skin:     General: Skin is warm and dry.   Neurological:      Mental Status: She is alert and oriented to person, place, and time. Mental status is at baseline.   Psychiatric:         Mood and Affect: Mood normal.         Behavior: Behavior normal.       Assessment/Plan       Reviewed results of blood work completed with patient.      #1. Asthma: Normally well controlled with Albuterol PRN. No acute symptoms noted at present. No longer needing maintenance inhaler.   #2. Vitamin D Deficiency: Continue to increase Vitamin D. Repeat level with next blood work.  #3. Obesity: BMI: 31.86. Previously given  information on Bariatric Weight Management program. Continuing to work on weight loss.   #4. Allergic Rhinitis: Symptoms are controlled without the use of medication at present. Continue to monitor.   #5. ADHD, #6. 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 Records received in 2015. She states that she needs a new Provider that is more local but [facility] the closest is [facility] per patient. She had been taking Strattera and Adderall and had to stop Rx. due to Insurance and then had other home complications. Not as effective. Strattera and Adderall Rx. Followed with [facility] referral. Not able to see Psych at this time. Continue medications as previously prescribed. I have personally reviewed the OARRS report.  This report is scanned into the electronic medical record.  I have considered the risks of abuse, dependence, addiction and diversion.  I believe that it is clinically appropriate to prescribe this medication. Drug Screen: April 2025. Contract: April 2025.   #8. Hyperlipidemia: Patient has continued to work on lifestyle modifications. Continue to monitor. Repeat level with next blood work.   #9. Vitamin B12 Deficiency: Vitamin B12. Continue to monitor. Repeat level with next blood work.   #10. Incontinence: Uro/Gyn referral. Ordered PT and was to follow up after. Has not follow through with Referral at this time.   #11. Rhinitis: Medication as prescribed.      Mammogram: November 2024, negative.   Cologuard: April 2023, negative.   Wellness: December 2024.   GYN Referral.     CLARENCE Cohen-CNS 04/17/25 11:22 AM

## 2025-04-18 LAB
AMPHETAMINES UR QL: NEGATIVE NG/ML
BARBITURATES UR QL: NEGATIVE NG/ML
BENZODIAZ UR QL: NEGATIVE NG/ML
BZE UR QL: NEGATIVE NG/ML
CREAT UR-MCNC: 95.3 MG/DL
FENTANYL UR QL SCN: NEGATIVE NG/ML
METHADONE UR QL: NEGATIVE NG/ML
OPIATES UR QL: NEGATIVE NG/ML
OXIDANTS UR QL: NEGATIVE MCG/ML
OXYCODONE UR QL: NEGATIVE NG/ML
PCP UR QL: NEGATIVE NG/ML
PH UR: 6.1 [PH] (ref 4.5–9)
QUEST NOTES AND COMMENTS: NORMAL
THC UR QL: NEGATIVE NG/ML

## 2025-08-27 ENCOUNTER — APPOINTMENT (OUTPATIENT)
Dept: PRIMARY CARE | Facility: CLINIC | Age: 50
End: 2025-08-27
Payer: COMMERCIAL

## 2025-08-27 DIAGNOSIS — J30.89 ALLERGIC RHINITIS DUE TO OTHER ALLERGIC TRIGGER, UNSPECIFIED SEASONALITY: ICD-10-CM

## 2025-08-27 DIAGNOSIS — Z12.31 VISIT FOR SCREENING MAMMOGRAM: ICD-10-CM

## 2025-08-27 DIAGNOSIS — J45.20 MILD INTERMITTENT ASTHMA, UNSPECIFIED WHETHER COMPLICATED (HHS-HCC): ICD-10-CM

## 2025-08-27 DIAGNOSIS — E55.9 VITAMIN D INSUFFICIENCY: ICD-10-CM

## 2025-08-27 DIAGNOSIS — Z79.899 LONG TERM CURRENT USE OF THERAPEUTIC DRUG: ICD-10-CM

## 2025-08-27 DIAGNOSIS — Z86.59 HISTORY OF DYSTHYMIC DISORDER: ICD-10-CM

## 2025-08-27 DIAGNOSIS — F90.9 HYPERKINETIC SYNDROME: ICD-10-CM

## 2025-08-27 DIAGNOSIS — F90.2 ADHD (ATTENTION DEFICIT HYPERACTIVITY DISORDER), COMBINED TYPE: ICD-10-CM

## 2025-08-27 DIAGNOSIS — E53.8 VITAMIN B12 DEFICIENCY: ICD-10-CM

## 2025-08-27 DIAGNOSIS — F41.9 ANXIETY: Primary | ICD-10-CM

## 2025-08-27 PROCEDURE — 1036F TOBACCO NON-USER: CPT | Performed by: CLINICAL NURSE SPECIALIST

## 2025-08-27 PROCEDURE — 99213 OFFICE O/P EST LOW 20 MIN: CPT | Performed by: CLINICAL NURSE SPECIALIST

## 2025-08-27 RX ORDER — ATOMOXETINE 40 MG/1
40 CAPSULE ORAL DAILY
Qty: 30 CAPSULE | Refills: 2 | Status: SHIPPED | OUTPATIENT
Start: 2025-08-27 | End: 2025-11-25

## 2025-08-27 RX ORDER — DEXTROAMPHETAMINE SACCHARATE, AMPHETAMINE ASPARTATE, DEXTROAMPHETAMINE SULFATE AND AMPHETAMINE SULFATE 2.5; 2.5; 2.5; 2.5 MG/1; MG/1; MG/1; MG/1
10 TABLET ORAL DAILY
Qty: 30 TABLET | Refills: 0 | Status: SHIPPED | OUTPATIENT
Start: 2025-08-27 | End: 2025-09-26

## 2025-08-27 ASSESSMENT — ENCOUNTER SYMPTOMS
POLYDIPSIA: 0
TROUBLE SWALLOWING: 0
CHEST TIGHTNESS: 0
BLOOD IN STOOL: 0
FEVER: 0
DYSURIA: 0
COUGH: 0
SHORTNESS OF BREATH: 0
NAUSEA: 0
WHEEZING: 0
DIARRHEA: 0
PALPITATIONS: 0
SEIZURES: 0
BACK PAIN: 0
WOUND: 0
DIZZINESS: 0
ACTIVITY CHANGE: 0
UNEXPECTED WEIGHT CHANGE: 0
PHOTOPHOBIA: 0
CHILLS: 0
CONSTIPATION: 0
HEMATURIA: 0
BRUISES/BLEEDS EASILY: 0
JOINT SWELLING: 0
FLANK PAIN: 0
HEADACHES: 0
MYALGIAS: 0
VOMITING: 0
NECK PAIN: 0
EYE PAIN: 0
ABDOMINAL PAIN: 0
SLEEP DISTURBANCE: 0
ARTHRALGIAS: 0
APPETITE CHANGE: 0
FATIGUE: 0
SORE THROAT: 0
CONFUSION: 0

## 2025-09-15 ENCOUNTER — APPOINTMENT (OUTPATIENT)
Dept: PRIMARY CARE | Facility: CLINIC | Age: 50
End: 2025-09-15
Payer: COMMERCIAL

## 2025-11-05 ENCOUNTER — APPOINTMENT (OUTPATIENT)
Dept: PRIMARY CARE | Facility: CLINIC | Age: 50
End: 2025-11-05
Payer: COMMERCIAL